# Patient Record
Sex: FEMALE | Race: WHITE | HISPANIC OR LATINO | URBAN - METROPOLITAN AREA
[De-identification: names, ages, dates, MRNs, and addresses within clinical notes are randomized per-mention and may not be internally consistent; named-entity substitution may affect disease eponyms.]

---

## 2021-02-09 ENCOUNTER — ANNUAL EXAM (OUTPATIENT)
Dept: OBGYN CLINIC | Facility: CLINIC | Age: 31
End: 2021-02-09
Payer: COMMERCIAL

## 2021-02-09 VITALS
SYSTOLIC BLOOD PRESSURE: 96 MMHG | WEIGHT: 128 LBS | TEMPERATURE: 97.8 F | DIASTOLIC BLOOD PRESSURE: 58 MMHG | HEIGHT: 63 IN | BODY MASS INDEX: 22.68 KG/M2

## 2021-02-09 DIAGNOSIS — Z01.419 ROUTINE GYNECOLOGICAL EXAMINATION: Primary | ICD-10-CM

## 2021-02-09 DIAGNOSIS — Z11.51 SCREENING FOR HUMAN PAPILLOMAVIRUS: ICD-10-CM

## 2021-02-09 DIAGNOSIS — Z12.4 SCREENING FOR MALIGNANT NEOPLASM OF THE CERVIX: ICD-10-CM

## 2021-02-09 PROCEDURE — 87624 HPV HI-RISK TYP POOLED RSLT: CPT | Performed by: OBSTETRICS & GYNECOLOGY

## 2021-02-09 PROCEDURE — G0145 SCR C/V CYTO,THINLAYER,RESCR: HCPCS | Performed by: OBSTETRICS & GYNECOLOGY

## 2021-02-09 PROCEDURE — 99385 PREV VISIT NEW AGE 18-39: CPT | Performed by: OBSTETRICS & GYNECOLOGY

## 2021-02-10 NOTE — PROGRESS NOTES
Subjective      Trav Hoff is a 26 y o  female who presents for annual well woman exam  Periods are regular every 28-30 days, lasting 5 days  No intermenstrual bleeding, spotting, or discharge  Current contraception: IUD  History of abnormal Pap smear: no    Menstrual History:  OB History        4    Para   3    Term                AB   1    Living   3       SAB   1    TAB        Ectopic        Multiple        Live Births                   Patient's last menstrual period was 2021 (approximate)  Period Cycle (Days): 28  Period Duration (Days): 4  Period Pattern: Regular  Menstrual Flow: Light  Dysmenorrhea: None    The following portions of the patient's history were reviewed and updated as appropriate: allergies, current medications, past family history, past medical history, past social history, past surgical history and problem list     Review of Systems  Review of Systems   Constitutional: Negative for activity change, appetite change, chills, fatigue and fever  Respiratory: Negative for cough and shortness of breath  Cardiovascular: Negative for chest pain, palpitations and leg swelling  Gastrointestinal: Negative for abdominal pain, constipation, diarrhea, nausea and vomiting  Genitourinary: Negative for difficulty urinating, dysuria, flank pain, frequency, hematuria, urgency and vaginal discharge  Neurological: Negative for dizziness and headaches  Psychiatric/Behavioral: Negative for confusion            Objective      BP 96/58 (BP Location: Left arm, Patient Position: Sitting, Cuff Size: Adult)   Temp 97 8 °F (36 6 °C) (Temporal)   Ht 5' 3" (1 6 m)   Wt 58 1 kg (128 lb)   LMP 2021 (Approximate)   BMI 22 67 kg/m²     Physical Exam  OBGyn Exam     General:   alert and oriented, in no acute distress, alert, appears stated age and cooperative   Heart: regular rate and rhythm, S1, S2 normal, no murmur, click, rub or gallop   Lungs: clear to auscultation bilaterally   Abdomen: soft, non-tender, without masses or organomegaly   Vulva: normal   Vagina: normal mucosa, normal discharge   Cervix: no cervical motion tenderness, no lesions and iud string seen    Uterus: normal size   Adnexa:  Breast Exam:  normal adnexa  breasts appear normal, no suspicious masses, no skin or nipple changes or axillary nodes  Scar from breast reduction                Assessment      @well woman@   27 yo female   Annual exam   IUD contraception   Asthma stable  Plan   Pap/hpv  Diet/exercsie  Ca vti D   RTO FOR ANNUAL EXAM    All questions answered  There are no Patient Instructions on file for this visit

## 2021-02-11 LAB
HPV HR 12 DNA CVX QL NAA+PROBE: NEGATIVE
HPV16 DNA CVX QL NAA+PROBE: NEGATIVE
HPV18 DNA CVX QL NAA+PROBE: NEGATIVE

## 2021-02-15 LAB
LAB AP GYN PRIMARY INTERPRETATION: NORMAL
Lab: NORMAL
PATH INTERP SPEC-IMP: NORMAL

## 2021-03-02 ENCOUNTER — OFFICE VISIT (OUTPATIENT)
Dept: OBGYN CLINIC | Facility: CLINIC | Age: 31
End: 2021-03-02
Payer: COMMERCIAL

## 2021-03-02 VITALS
BODY MASS INDEX: 22.32 KG/M2 | TEMPERATURE: 97.6 F | DIASTOLIC BLOOD PRESSURE: 62 MMHG | SYSTOLIC BLOOD PRESSURE: 112 MMHG | HEIGHT: 63 IN | WEIGHT: 126 LBS

## 2021-03-02 DIAGNOSIS — Z30.432 ENCOUNTER FOR IUD REMOVAL: ICD-10-CM

## 2021-03-02 DIAGNOSIS — B96.89 BV (BACTERIAL VAGINOSIS): ICD-10-CM

## 2021-03-02 DIAGNOSIS — N76.0 BV (BACTERIAL VAGINOSIS): ICD-10-CM

## 2021-03-02 DIAGNOSIS — Z97.5 CONTRACEPTION, DEVICE INTRAUTERINE: Primary | ICD-10-CM

## 2021-03-02 PROCEDURE — 58300 INSERT INTRAUTERINE DEVICE: CPT | Performed by: OBSTETRICS & GYNECOLOGY

## 2021-03-02 RX ORDER — ALBUTEROL SULFATE 90 UG/1
1 AEROSOL, METERED RESPIRATORY (INHALATION) EVERY 6 HOURS PRN
COMMUNITY

## 2021-03-02 RX ORDER — METRONIDAZOLE 500 MG/1
500 TABLET ORAL EVERY 12 HOURS SCHEDULED
Qty: 14 TABLET | Refills: 0 | Status: SHIPPED | OUTPATIENT
Start: 2021-03-02 | End: 2021-03-09

## 2021-03-03 NOTE — PROGRESS NOTES
Assessment/Plan:     Diagnoses and all orders for this visit:    Contraception, device intrauterine  -     levonorgestrel (MIRENA) IUD 20 mcg/day    BV (bacterial vaginosis)  -     metroNIDAZOLE (FLAGYL) 500 mg tablet; Take 1 tablet (500 mg total) by mouth every 12 (twelve) hours for 7 days    Other orders  -     albuterol (PROVENTIL HFA,VENTOLIN HFA) 90 mcg/act inhaler; Inhale 1 puff every 6 (six) hours as needed  -     levonorgestrel (MIRENA) 20 MCG/24HR IUD; 1 each by Intrauterine route        57-year-old female   IUD Mirena desired placement   White vaginal discharge positive BV  Plan   Flagyl   IUD removed and or UD Mirena inserted   Return to office in 4 weeks for string check  Subjective:      Patient ID: Tarv Hoff is a 26 y o  female  HPI  57-year-old female presents to the office today for IUD replacement desired Mirena IUD to be inserted  Iud insertions    Date/Time: 3/2/2021 8:40 PM  Performed by: Sreekanth Meza MD  Authorized by: Sreekanth Meza MD   Universal Protocol:  Consent: Verbal consent obtained  Written consent obtained  Risks and benefits: risks, benefits and alternatives were discussed  Consent given by: patient  Time out: Immediately prior to procedure a "time out" was called to verify the correct patient, procedure, equipment, support staff and site/side marked as required    Patient understanding: patient states understanding of the procedure being performed  Patient consent: the patient's understanding of the procedure matches consent given  Procedure consent: procedure consent matches procedure scheduled  Patient identity confirmed: verbally with patient        Procedure:     Pelvic exam performed: yes      Negative urine pregnancy test: yes      Cervix cleaned and prepped: yes      Speculum placed in vagina: yes      Tenaculum applied to cervix: yes      Uterus sounded: yes      Uterus sound depth (cm):  6    IUD inserted with no complications: yes      IUD type:  Mirena Strings trimmed: yes    Post-procedure:     Patient tolerated procedure well: yes      Patient will follow up after next period: yes    Comments:      Prior to IUD insertion IUD string was seen grasped with Viri Helton clamp shown to the patient white vaginal discharge noted Wet mount obtained positive clue cells and positive whiff test      The following portions of the patient's history were reviewed and updated as appropriate: allergies, current medications, past family history, past medical history, past social history, past surgical history and problem list     Review of Systems      Objective:      /62 (BP Location: Left arm, Patient Position: Sitting, Cuff Size: Adult)   Temp 97 6 °F (36 4 °C) (Temporal)   Ht 5' 3" (1 6 m)   Wt 57 2 kg (126 lb)   BMI 22 32 kg/m²          Physical Exam

## 2022-07-26 ENCOUNTER — CONSULT (OUTPATIENT)
Dept: OBGYN CLINIC | Facility: CLINIC | Age: 32
End: 2022-07-26
Payer: COMMERCIAL

## 2022-07-26 VITALS
WEIGHT: 142.4 LBS | BODY MASS INDEX: 25.23 KG/M2 | SYSTOLIC BLOOD PRESSURE: 104 MMHG | HEIGHT: 63 IN | DIASTOLIC BLOOD PRESSURE: 62 MMHG

## 2022-07-26 DIAGNOSIS — Z30.09 CONSULTATION FOR FEMALE STERILIZATION: Primary | ICD-10-CM

## 2022-07-26 PROCEDURE — 99213 OFFICE O/P EST LOW 20 MIN: CPT | Performed by: OBSTETRICS & GYNECOLOGY

## 2022-07-26 NOTE — PROGRESS NOTES
Assessment/Plan:     Diagnoses and all orders for this visit:    Consultation for female sterilization       26-year-old female   IUD contraception   Patient desired permanent sterilization  Plan   Scheduled for V note laparoscopic bilateral salpingectomy IUD removal   Return to office for annual exam    Subjective:      Patient ID: Evy Diaz is a 27 y o  female  HPI  26-year-old female presents to the office today for consult desire sterilization  Patient is currently has IUD Mirena and desired IUD removal as well as patient desire permanent sterilization   Different contraception option explained and discussed with patient in details with risk and benefit as well as side effect patient still desired to proceed with permanent sterilization bilateral salpingectomy discussed with patient risk of procedure bleeding infection trauma blood transfusion risk of bowel injury risk of needing another surgery or explained and discussed with patient risk of anesthesia nerve injury from positioning discussed patient aware of all the risk and desire to proceed with permanent sterilization will proceed with v NOTES  laparoscopic bilateral salpingectomy, possible laparoscopic salpingectomy Procedure explained and discussed with patient  The following portions of the patient's history were reviewed and updated as appropriate: allergies, current medications, past family history, past medical history, past social history, past surgical history and problem list     Review of Systems      Objective:      /62 (BP Location: Left arm, Patient Position: Sitting, Cuff Size: Adult)   Ht 5' 3" (1 6 m)   Wt 64 6 kg (142 lb 6 4 oz)   LMP 07/21/2022 (Exact Date)   BMI 25 23 kg/m²          Physical Exam  Constitutional:       Appearance: Normal appearance  Neurological:      General: No focal deficit present  Mental Status: She is alert and oriented to person, place, and time     Psychiatric:         Mood and Affect: Mood normal          Behavior: Behavior normal

## 2022-08-12 ENCOUNTER — HOSPITAL ENCOUNTER (EMERGENCY)
Facility: HOSPITAL | Age: 32
Discharge: HOME | End: 2022-08-12
Attending: EMERGENCY MEDICINE
Payer: COMMERCIAL

## 2022-08-12 VITALS
OXYGEN SATURATION: 98 % | TEMPERATURE: 98.6 F | RESPIRATION RATE: 17 BRPM | SYSTOLIC BLOOD PRESSURE: 109 MMHG | HEART RATE: 103 BPM | DIASTOLIC BLOOD PRESSURE: 80 MMHG

## 2022-08-12 DIAGNOSIS — K02.9 PAIN DUE TO DENTAL CARIES: Primary | ICD-10-CM

## 2022-08-12 PROCEDURE — 99282 EMERGENCY DEPT VISIT SF MDM: CPT

## 2022-08-12 PROCEDURE — 99284 EMERGENCY DEPT VISIT MOD MDM: CPT | Performed by: PHYSICIAN ASSISTANT

## 2022-08-12 RX ORDER — IBUPROFEN 600 MG/1
600 TABLET ORAL ONCE
Status: COMPLETED | OUTPATIENT
Start: 2022-08-12 | End: 2022-08-12

## 2022-08-12 RX ORDER — PENICILLIN V POTASSIUM 500 MG/1
1000 TABLET ORAL 2 TIMES DAILY
Qty: 40 TABLET | Refills: 0 | Status: SHIPPED | OUTPATIENT
Start: 2022-08-12 | End: 2022-08-22

## 2022-08-12 RX ORDER — IBUPROFEN 600 MG/1
600 TABLET ORAL EVERY 6 HOURS PRN
Qty: 20 TABLET | Refills: 0 | Status: SHIPPED | OUTPATIENT
Start: 2022-08-12

## 2022-08-12 RX ADMIN — IBUPROFEN 600 MG: 600 TABLET, FILM COATED ORAL at 18:29

## 2022-08-12 NOTE — DISCHARGE INSTRUCTIONS
Use Tylenol every 4 hours or Motrin every 6 hours; you can alternate the 2 medications taking something every 3 hours for pain or fever  Take all oral antibiotics until done        Follow-up with dentist

## 2022-08-12 NOTE — ED PROVIDER NOTES
History  Chief Complaint   Patient presents with    Dental Pain     Pt presents to ED with c/o right lower dental pain, pain started 2 days, pt took tylenol     PMH asthma  no pertinent PSH   presents to ED states chronic right back molar fracture, now over the past 2 days, has had increased pain, area feels swollen, concern for possible abscess  Patient called dentist but could not schedule an appointment for over a month  No fever, no facial swelling, no nausea vomiting; has only been taking Tylenol for pain          Prior to Admission Medications   Prescriptions Last Dose Informant Patient Reported? Taking? albuterol (PROVENTIL HFA,VENTOLIN HFA) 90 mcg/act inhaler   Yes No   Sig: Inhale 1 puff every 6 (six) hours as needed   levonorgestrel (MIRENA) 20 MCG/24HR IUD   Yes No   Si each by Intrauterine route      Facility-Administered Medications: None       Past Medical History:   Diagnosis Date    Asthma        Past Surgical History:   Procedure Laterality Date    REDUCTION MAMMAPLASTY         Family History   Problem Relation Age of Onset    Diabetes Mother     Prostate cancer Father     Breast cancer Paternal Aunt      I have reviewed and agree with the history as documented  E-Cigarette/Vaping    E-Cigarette Use Never User      E-Cigarette/Vaping Substances    Nicotine No     THC No     CBD No     Flavoring No     Other No     Unknown No      Social History     Tobacco Use    Smoking status: Never Smoker    Smokeless tobacco: Never Used   Vaping Use    Vaping Use: Never used   Substance Use Topics    Alcohol use: Yes     Comment: socially    Drug use: Never       Review of Systems   Constitutional: Negative for appetite change and fever  HENT: Positive for dental problem  Negative for hearing loss and sore throat  Eyes: Negative for visual disturbance  Respiratory: Negative for cough  Cardiovascular: Negative for chest pain     Gastrointestinal: Negative for abdominal pain and vomiting  Musculoskeletal: Negative for arthralgias and myalgias  Skin: Negative for rash  Neurological: Negative for weakness and headaches  Psychiatric/Behavioral: Negative for behavioral problems  All other systems reviewed and are negative  Physical Exam  Physical Exam  Vitals and nursing note reviewed  Constitutional:       General: She is not in acute distress  Appearance: Normal appearance  She is well-developed  HENT:      Head: Normocephalic and atraumatic  Right Ear: External ear normal       Left Ear: External ear normal       Nose: Nose normal       Mouth/Throat:      Mouth: Mucous membranes are moist       Dentition: Abnormal dentition  Dental tenderness and dental caries present  No gingival swelling or dental abscesses  Pharynx: Oropharynx is clear  Comments: Broken (circled) tooth along inner aspect, no facial swelling  Eyes:      Conjunctiva/sclera: Conjunctivae normal    Cardiovascular:      Rate and Rhythm: Normal rate  Pulmonary:      Effort: Pulmonary effort is normal  No respiratory distress  Musculoskeletal:         General: Normal range of motion  Cervical back: Normal range of motion  Lymphadenopathy:      Cervical: No cervical adenopathy  Skin:     General: Skin is warm and dry  Findings: No rash  Neurological:      Mental Status: She is alert     Psychiatric:         Behavior: Behavior normal          Vital Signs  ED Triage Vitals   Temperature Pulse Respirations Blood Pressure SpO2   08/12/22 1816 08/12/22 1816 08/12/22 1816 08/12/22 1816 08/12/22 1816   98 6 °F (37 °C) 103 17 109/80 98 %      Temp Source Heart Rate Source Patient Position - Orthostatic VS BP Location FiO2 (%)   08/12/22 1816 08/12/22 1816 -- -- --   Oral Monitor         Pain Score       08/12/22 1829       10 - Worst Possible Pain           Vitals:    08/12/22 1816   BP: 109/80   Pulse: 103         Visual Acuity      ED Medications  Medications   ibuprofen (MOTRIN) tablet 600 mg (600 mg Oral Given 8/12/22 1829)       Diagnostic Studies  Results Reviewed     None                 No orders to display              Procedures  Procedures         ED Course                               SBIRT 22yo+    Flowsheet Row Most Recent Value   SBIRT (25 yo +)    In order to provide better care to our patients, we are screening all of our patients for alcohol and drug use  Would it be okay to ask you these screening questions? No Filed at: 08/12/2022 1830                    MDM    Disposition  Final diagnoses:   Pain due to dental caries     Time reflects when diagnosis was documented in both MDM as applicable and the Disposition within this note     Time User Action Codes Description Comment    8/12/2022  6:19 PM Ezequiel Carbone Add [K02 9] Pain due to dental caries       ED Disposition     ED Disposition   Discharge    Condition   Stable    Date/Time   Fri Aug 12, 2022  6:19 PM    Comment   [address] Portillo discharge to home/self care                 Follow-up Information     Follow up With Specialties Details Why Contact Info Additional [address] [facility]   [address] [phone]  [address] [place] [address], [phone], [id number] [place] [address],[facility] [address] [facility] [place] [address]  [phone]           Discharge Medication List as of 8/12/2022  6:21 PM      START taking these medications    Details   ibuprofen (MOTRIN) 600 mg tablet Take 1 tablet (600 mg total) by mouth every 6 (six) hours as needed for mild pain, Starting Fri 8/12/2022, Normal      penicillin V potassium (VEETID) 500 mg tablet Take 2 tablets (1,000 mg total) by mouth 2 (two) times a day for 10 days, Starting Fri 8/12/2022, Until Mon 8/22/2022, Normal         CONTINUE these medications which have NOT CHANGED    Details   albuterol (PROVENTIL HFA,VENTOLIN HFA) 90 mcg/act inhaler Inhale 1 puff every 6 (six) hours as needed, Historical Med      levonorgestrel (MIRENA) 20 MCG/24HR IUD 1 each by Intrauterine route, Historical Med             No discharge procedures on file      PDMP Review     None          ED Provider  Electronically Signed by           Misha Benjamin PA-C  08/12/22 7651

## 2022-10-11 ENCOUNTER — APPOINTMENT (OUTPATIENT)
Dept: LAB | Facility: HOSPITAL | Age: 32
End: 2022-10-11
Payer: COMMERCIAL

## 2022-10-11 DIAGNOSIS — Z01.818 PRE-OP TESTING: ICD-10-CM

## 2022-10-11 LAB
ANION GAP SERPL CALCULATED.3IONS-SCNC: 7 MMOL/L (ref 4–13)
BASOPHILS # BLD AUTO: 0.05 THOUSANDS/ΜL (ref 0–0.1)
BASOPHILS NFR BLD AUTO: 1 % (ref 0–1)
BUN SERPL-MCNC: 8 MG/DL (ref 5–25)
CALCIUM SERPL-MCNC: 8.5 MG/DL (ref 8.3–10.1)
CHLORIDE SERPL-SCNC: 105 MMOL/L (ref 96–108)
CO2 SERPL-SCNC: 28 MMOL/L (ref 21–32)
CREAT SERPL-MCNC: 0.68 MG/DL (ref 0.6–1.3)
EOSINOPHIL # BLD AUTO: 0.04 THOUSAND/ΜL (ref 0–0.61)
EOSINOPHIL NFR BLD AUTO: 1 % (ref 0–6)
ERYTHROCYTE [DISTWIDTH] IN BLOOD BY AUTOMATED COUNT: 12.2 % (ref 11.6–15.1)
GFR SERPL CREATININE-BSD FRML MDRD: 114 ML/MIN/1.73SQ M
GLUCOSE SERPL-MCNC: 90 MG/DL (ref 65–140)
HCT VFR BLD AUTO: 38.3 % (ref 34.8–46.1)
HGB BLD-MCNC: 12.7 G/DL (ref 11.5–15.4)
IMM GRANULOCYTES # BLD AUTO: 0.04 THOUSAND/UL (ref 0–0.2)
IMM GRANULOCYTES NFR BLD AUTO: 1 % (ref 0–2)
LYMPHOCYTES # BLD AUTO: 2.96 THOUSANDS/ΜL (ref 0.6–4.47)
LYMPHOCYTES NFR BLD AUTO: 40 % (ref 14–44)
MCH RBC QN AUTO: 29.7 PG (ref 26.8–34.3)
MCHC RBC AUTO-ENTMCNC: 33.2 G/DL (ref 31.4–37.4)
MCV RBC AUTO: 90 FL (ref 82–98)
MONOCYTES # BLD AUTO: 0.48 THOUSAND/ΜL (ref 0.17–1.22)
MONOCYTES NFR BLD AUTO: 6 % (ref 4–12)
NEUTROPHILS # BLD AUTO: 3.92 THOUSANDS/ΜL (ref 1.85–7.62)
NEUTS SEG NFR BLD AUTO: 51 % (ref 43–75)
NRBC BLD AUTO-RTO: 0 /100 WBCS
PLATELET # BLD AUTO: 330 THOUSANDS/UL (ref 149–390)
PMV BLD AUTO: 9.4 FL (ref 8.9–12.7)
POTASSIUM SERPL-SCNC: 3.8 MMOL/L (ref 3.5–5.3)
RBC # BLD AUTO: 4.28 MILLION/UL (ref 3.81–5.12)
SODIUM SERPL-SCNC: 140 MMOL/L (ref 135–147)
WBC # BLD AUTO: 7.49 THOUSAND/UL (ref 4.31–10.16)

## 2022-10-11 PROCEDURE — 36415 COLL VENOUS BLD VENIPUNCTURE: CPT

## 2022-10-11 PROCEDURE — 80048 BASIC METABOLIC PNL TOTAL CA: CPT

## 2022-10-11 PROCEDURE — 85025 COMPLETE CBC W/AUTO DIFF WBC: CPT

## 2022-10-12 NOTE — PRE-PROCEDURE INSTRUCTIONS
Pre-Surgery Instructions:   Medication Instructions   • albuterol (PROVENTIL HFA,VENTOLIN HFA) 90 mcg/act inhaler Instructed to take per normal schedule including DOS   • ibuprofen (MOTRIN) 600 mg tablet Instructed to avoid NSAIDs 3 days prior to surgery   • levonorgestrel (MIRENA) 20 MCG/24HR IUD IUD    Pre-op medication, and showering instructions with antibacteral soap reviewed  Instructed to avoid all ASA/NSAIDs and OTC Vit/Supp from now until after surgery per anesthesia guidelines  Tylenol ok prn  Pt  Verbalized an understanding of all instructions reviewed and offers no concerns at this time

## 2022-10-17 ENCOUNTER — ANESTHESIA EVENT (OUTPATIENT)
Dept: PERIOP | Facility: HOSPITAL | Age: 32
End: 2022-10-17
Payer: COMMERCIAL

## 2022-10-17 ENCOUNTER — ANESTHESIA (OUTPATIENT)
Dept: PERIOP | Facility: HOSPITAL | Age: 32
End: 2022-10-17
Payer: COMMERCIAL

## 2022-10-17 ENCOUNTER — HOSPITAL ENCOUNTER (OUTPATIENT)
Facility: HOSPITAL | Age: 32
Setting detail: OUTPATIENT SURGERY
Discharge: HOME | End: 2022-10-17
Attending: OBSTETRICS & GYNECOLOGY | Admitting: OBSTETRICS & GYNECOLOGY
Payer: COMMERCIAL

## 2022-10-17 VITALS
RESPIRATION RATE: 15 BRPM | OXYGEN SATURATION: 96 % | SYSTOLIC BLOOD PRESSURE: 112 MMHG | BODY MASS INDEX: 24.45 KG/M2 | HEART RATE: 80 BPM | WEIGHT: 138 LBS | DIASTOLIC BLOOD PRESSURE: 55 MMHG | TEMPERATURE: 98 F | HEIGHT: 63 IN

## 2022-10-17 DIAGNOSIS — K02.9 PAIN DUE TO DENTAL CARIES: ICD-10-CM

## 2022-10-17 DIAGNOSIS — Z90.79 STATUS POST BILATERAL SALPINGECTOMY: Primary | ICD-10-CM

## 2022-10-17 DIAGNOSIS — Z30.2 STERILIZATION: ICD-10-CM

## 2022-10-17 LAB
EXT PREGNANCY TEST URINE: NEGATIVE
EXT. CONTROL: NORMAL

## 2022-10-17 PROCEDURE — 88302 TISSUE EXAM BY PATHOLOGIST: CPT | Performed by: STUDENT IN AN ORGANIZED HEALTH CARE EDUCATION/TRAINING PROGRAM

## 2022-10-17 PROCEDURE — NC001 PR NO CHARGE: Performed by: OBSTETRICS & GYNECOLOGY

## 2022-10-17 PROCEDURE — 81025 URINE PREGNANCY TEST: CPT | Performed by: OBSTETRICS & GYNECOLOGY

## 2022-10-17 RX ORDER — DEXAMETHASONE SODIUM PHOSPHATE 10 MG/ML
INJECTION, SOLUTION INTRAMUSCULAR; INTRAVENOUS AS NEEDED
Status: DISCONTINUED | OUTPATIENT
Start: 2022-10-17 | End: 2022-10-17

## 2022-10-17 RX ORDER — CEFAZOLIN SODIUM 2 G/50ML
2000 SOLUTION INTRAVENOUS ONCE
Status: COMPLETED | OUTPATIENT
Start: 2022-10-17 | End: 2022-10-17

## 2022-10-17 RX ORDER — PROPOFOL 10 MG/ML
INJECTION, EMULSION INTRAVENOUS CONTINUOUS PRN
Status: DISCONTINUED | OUTPATIENT
Start: 2022-10-17 | End: 2022-10-17

## 2022-10-17 RX ORDER — SODIUM CHLORIDE, SODIUM LACTATE, POTASSIUM CHLORIDE, CALCIUM CHLORIDE 600; 310; 30; 20 MG/100ML; MG/100ML; MG/100ML; MG/100ML
INJECTION, SOLUTION INTRAVENOUS CONTINUOUS PRN
Status: DISCONTINUED | OUTPATIENT
Start: 2022-10-17 | End: 2022-10-17

## 2022-10-17 RX ORDER — SODIUM CHLORIDE, SODIUM LACTATE, POTASSIUM CHLORIDE, CALCIUM CHLORIDE 600; 310; 30; 20 MG/100ML; MG/100ML; MG/100ML; MG/100ML
100 INJECTION, SOLUTION INTRAVENOUS CONTINUOUS
Status: DISCONTINUED | OUTPATIENT
Start: 2022-10-17 | End: 2022-10-17 | Stop reason: HOSPADM

## 2022-10-17 RX ORDER — KETOROLAC TROMETHAMINE 30 MG/ML
INJECTION, SOLUTION INTRAMUSCULAR; INTRAVENOUS AS NEEDED
Status: DISCONTINUED | OUTPATIENT
Start: 2022-10-17 | End: 2022-10-17

## 2022-10-17 RX ORDER — BUPIVACAINE HYDROCHLORIDE AND EPINEPHRINE 2.5; 5 MG/ML; UG/ML
INJECTION, SOLUTION EPIDURAL; INFILTRATION; INTRACAUDAL; PERINEURAL AS NEEDED
Status: DISCONTINUED | OUTPATIENT
Start: 2022-10-17 | End: 2022-10-17 | Stop reason: HOSPADM

## 2022-10-17 RX ORDER — SENNOSIDES 8.6 MG
650 CAPSULE ORAL EVERY 8 HOURS PRN
Qty: 30 TABLET | Refills: 0 | Status: SHIPPED | OUTPATIENT
Start: 2022-10-17

## 2022-10-17 RX ORDER — LIDOCAINE HYDROCHLORIDE 10 MG/ML
INJECTION, SOLUTION EPIDURAL; INFILTRATION; INTRACAUDAL; PERINEURAL AS NEEDED
Status: DISCONTINUED | OUTPATIENT
Start: 2022-10-17 | End: 2022-10-17

## 2022-10-17 RX ORDER — ROCURONIUM BROMIDE 10 MG/ML
INJECTION, SOLUTION INTRAVENOUS AS NEEDED
Status: DISCONTINUED | OUTPATIENT
Start: 2022-10-17 | End: 2022-10-17

## 2022-10-17 RX ORDER — IBUPROFEN 600 MG/1
600 TABLET ORAL EVERY 6 HOURS PRN
Qty: 30 TABLET | Refills: 0 | Status: SHIPPED | OUTPATIENT
Start: 2022-10-17

## 2022-10-17 RX ORDER — ONDANSETRON 2 MG/ML
INJECTION INTRAMUSCULAR; INTRAVENOUS AS NEEDED
Status: DISCONTINUED | OUTPATIENT
Start: 2022-10-17 | End: 2022-10-17

## 2022-10-17 RX ORDER — OXYCODONE HYDROCHLORIDE 5 MG/1
5 TABLET ORAL EVERY 4 HOURS PRN
Status: DISCONTINUED | OUTPATIENT
Start: 2022-10-17 | End: 2022-10-17 | Stop reason: HOSPADM

## 2022-10-17 RX ORDER — ACETAMINOPHEN 325 MG/1
975 TABLET ORAL EVERY 6 HOURS PRN
Status: DISCONTINUED | OUTPATIENT
Start: 2022-10-17 | End: 2022-10-17 | Stop reason: HOSPADM

## 2022-10-17 RX ORDER — MIDAZOLAM HYDROCHLORIDE 2 MG/2ML
INJECTION, SOLUTION INTRAMUSCULAR; INTRAVENOUS AS NEEDED
Status: DISCONTINUED | OUTPATIENT
Start: 2022-10-17 | End: 2022-10-17

## 2022-10-17 RX ORDER — PROPOFOL 10 MG/ML
INJECTION, EMULSION INTRAVENOUS AS NEEDED
Status: DISCONTINUED | OUTPATIENT
Start: 2022-10-17 | End: 2022-10-17

## 2022-10-17 RX ORDER — FENTANYL CITRATE 50 UG/ML
INJECTION, SOLUTION INTRAMUSCULAR; INTRAVENOUS AS NEEDED
Status: DISCONTINUED | OUTPATIENT
Start: 2022-10-17 | End: 2022-10-17

## 2022-10-17 RX ORDER — FENTANYL CITRATE/PF 50 MCG/ML
25 SYRINGE (ML) INJECTION
Status: DISCONTINUED | OUTPATIENT
Start: 2022-10-17 | End: 2022-10-17 | Stop reason: HOSPADM

## 2022-10-17 RX ORDER — ONDANSETRON 2 MG/ML
4 INJECTION INTRAMUSCULAR; INTRAVENOUS ONCE AS NEEDED
Status: DISCONTINUED | OUTPATIENT
Start: 2022-10-17 | End: 2022-10-17 | Stop reason: HOSPADM

## 2022-10-17 RX ORDER — OXYCODONE HYDROCHLORIDE 5 MG/1
10 TABLET ORAL EVERY 4 HOURS PRN
Status: DISCONTINUED | OUTPATIENT
Start: 2022-10-17 | End: 2022-10-17 | Stop reason: HOSPADM

## 2022-10-17 RX ORDER — HYDROMORPHONE HCL/PF 1 MG/ML
0.2 SYRINGE (ML) INJECTION
Status: DISCONTINUED | OUTPATIENT
Start: 2022-10-17 | End: 2022-10-17 | Stop reason: HOSPADM

## 2022-10-17 RX ORDER — ONDANSETRON 2 MG/ML
4 INJECTION INTRAMUSCULAR; INTRAVENOUS EVERY 6 HOURS PRN
Status: CANCELLED | OUTPATIENT
Start: 2022-10-17

## 2022-10-17 RX ADMIN — FENTANYL CITRATE 25 MCG: 50 INJECTION, SOLUTION INTRAMUSCULAR; INTRAVENOUS at 10:46

## 2022-10-17 RX ADMIN — LIDOCAINE HYDROCHLORIDE 50 MG: 10 INJECTION, SOLUTION EPIDURAL; INFILTRATION; INTRACAUDAL; PERINEURAL at 09:54

## 2022-10-17 RX ADMIN — FENTANYL CITRATE 25 MCG: 50 INJECTION, SOLUTION INTRAMUSCULAR; INTRAVENOUS at 10:54

## 2022-10-17 RX ADMIN — DEXAMETHASONE SODIUM PHOSPHATE 10 MG: 10 INJECTION, SOLUTION INTRAMUSCULAR; INTRAVENOUS at 09:54

## 2022-10-17 RX ADMIN — FENTANYL CITRATE 100 MCG: 50 INJECTION, SOLUTION INTRAMUSCULAR; INTRAVENOUS at 09:54

## 2022-10-17 RX ADMIN — FENTANYL CITRATE 50 MCG: 50 INJECTION, SOLUTION INTRAMUSCULAR; INTRAVENOUS at 11:04

## 2022-10-17 RX ADMIN — ONDANSETRON 4 MG: 2 INJECTION INTRAMUSCULAR; INTRAVENOUS at 10:44

## 2022-10-17 RX ADMIN — KETOROLAC TROMETHAMINE 30 MG: 30 INJECTION, SOLUTION INTRAMUSCULAR at 10:46

## 2022-10-17 RX ADMIN — PROPOFOL 150 MG: 10 INJECTION, EMULSION INTRAVENOUS at 09:54

## 2022-10-17 RX ADMIN — PROPOFOL 80 MCG/KG/MIN: 10 INJECTION, EMULSION INTRAVENOUS at 09:59

## 2022-10-17 RX ADMIN — MIDAZOLAM HYDROCHLORIDE 2 MG: 1 INJECTION, SOLUTION INTRAMUSCULAR; INTRAVENOUS at 09:50

## 2022-10-17 RX ADMIN — SODIUM CHLORIDE, SODIUM LACTATE, POTASSIUM CHLORIDE, AND CALCIUM CHLORIDE: .6; .31; .03; .02 INJECTION, SOLUTION INTRAVENOUS at 09:49

## 2022-10-17 RX ADMIN — SUGAMMADEX 200 MG: 100 INJECTION, SOLUTION INTRAVENOUS at 10:52

## 2022-10-17 RX ADMIN — CEFAZOLIN SODIUM 2000 MG: 2 SOLUTION INTRAVENOUS at 09:57

## 2022-10-17 RX ADMIN — ROCURONIUM BROMIDE 50 MG: 50 INJECTION, SOLUTION INTRAVENOUS at 09:54

## 2022-10-17 NOTE — ANESTHESIA POSTPROCEDURE EVALUATION
Post-Op Assessment Note    CV Status:  Stable    Pain management: adequate     Mental Status:  Alert   Hydration Status:  Stable   PONV Controlled:  None   Airway Patency:  Patent and adequate      Post Op Vitals Reviewed: Yes      Staff: Anesthesiologist, CRNA         No complications documented      BP   119/74   Temp  97 3   Pulse 88   Resp 14   SpO2 96% on RA

## 2022-10-17 NOTE — H&P
38-year-old female   IUD contraception   Patient desired permanent sterilization  Plan   Scheduled for V note laparoscopic bilateral salpingectomy IUD removal   Return to office for annual exam     Subjective:       Patient ID: Eunice Lynch is a 27 y o  female      HPI  38-year-old female presents to the office today for consult desire sterilization  Patient is currently has IUD Mirena and desired IUD removal as well as patient desire permanent sterilization   Different contraception option explained and discussed with patient in details with risk and benefit as well as side effect patient still desired to proceed with permanent sterilization bilateral salpingectomy discussed with patient risk of procedure bleeding infection trauma blood transfusion risk of bowel injury risk of needing another surgery or explained and discussed with patient risk of anesthesia nerve injury from positioning discussed patient aware of all the risk and desire to proceed with permanent sterilization will proceed with v NOTES  laparoscopic bilateral salpingectomy, possible laparoscopic salpingectomy Procedure explained and discussed with patient  The following portions of the patient's history were reviewed and updated as appropriate: allergies, current medications, past family history, past medical history, past social history, past surgical history and problem list      Review of Systems       Objective:        /62 (BP Location: Left arm, Patient Position: Sitting, Cuff Size: Adult)   Ht 5' 3" (1 6 m)   Wt 64 6 kg (142 lb 6 4 oz)   LMP 07/21/2022 (Exact Date)   BMI 25 23 kg/m²             Physical Exam  Constitutional:       Appearance: Normal appearance  Neurological:      General: No focal deficit present  Mental Status: She is alert and oriented to person, place, and time     Psychiatric:         Mood and Affect: Mood normal          Behavior: Behavior normal         abd soft NT  EXT NO EDEMA no calf tenderness  Lungs cta b/l   cvs s1/s2 wnl

## 2022-10-17 NOTE — INTERVAL H&P NOTE
H&P reviewed  After examining the patient I find no changes in the patients condition since the H&P had been written      Vitals:    10/17/22 0857   BP: 145/69   Pulse: 74   Resp: 16   Temp: (!) 97 °F (36 1 °C)   SpO2: 100%

## 2022-10-17 NOTE — ANESTHESIA PREPROCEDURE EVALUATION
Procedure:  VNOTE BILATERAL SALPINGECTOMY, LAPAROSCOPIC (Bilateral Abdomen)  REMOVAL OF INTRAUTERINE DEVICE (IUD) (N/A Uterus)    Relevant Problems   PULMONARY   (+) Asthma             Anesthesia Plan  ASA Score- 1     Anesthesia Type- general with ASA Monitors  Additional Monitors:   Airway Plan: ETT  Plan Factors-    Chart reviewed  Induction- intravenous  Postoperative Plan-     Informed Consent- Anesthetic plan and risks discussed with patient  I personally reviewed this patient with the CRNA  Discussed and agreed on the Anesthesia Plan with the CRNA  Leandro Brothers

## 2022-10-17 NOTE — OP NOTE
OPERATIVE REPORT  PATIENT NAME: Adry Garcia    :  1990  MRN: [id number]  Pt Location: [facility] OR ROOM 02    SURGERY DATE: 10/17/2022    Surgeon(s) and Role:     * Gutierrez Mandujano MD - Primary     * Amber Ortega PA-C - Lyssa Meng MD - Assisting    Preop Diagnosis:  Sterilization [Z30 2]    Post-Op Diagnosis Codes:     * Sterilization [Z30 2]    Procedure(s) (LRB):  VNOTE BILATERAL SALPINGECTOMY, LAPAROSCOPIC (Bilateral)  REMOVAL OF INTRAUTERINE DEVICE (IUD) (N/A)    Specimen(s):  ID Type Source Tests Collected by Time Destination   1 : BILATERAL FALLOPIAN TUBES Tissue Fallopian Tubes, Bilateral TISSUE EXAM Gutierrez Mandujano MD 10/17/2022 4797        Estimated Blood Loss:   Minimal    Drains:  [REMOVED] Urethral Catheter Double-lumen;Non-latex 16 Fr  (Removed)   Number of days: 0       Anesthesia Type:   General    Operative Indications:  Sterilization [Z30 2]      Operative Findings:  1  Normal appearing external genitalia  2  Multiparous cervix  3  Normal appearing uterus, bilateral fallopian tubes and ovaries  Complications:   None    Procedure and Technique: A Irving catheter was introduced into the bladder  A Rubin retractor was inserted into the vagina and rthe Rivka retractor used to visualize the anterior lip of the cervix, which was then grasped with an Allis clamp  1 cm below the cervicovaginal crease the vaginal mucosa was infiltrated with 0 25% Marcaine with epinephrine  A 2cm incision was made at the infiltrated edge of the vaginal mucosa and the peritoneum was grasped with forceps and entered sharply with Jasso scissors for introduction of the gel port ring  The anterior and posterior edges of the colpotomy were tagged with multiple 0 Vicryl sutures securing peritoneum to both edges of the colpotomy  Intraperitoneal entrance confirmed with the surgeon's hand palpating the posterior surface of the uterus   Allis clamp was removed an the gel port ring was inserted and the gel port was then attached and insufflation was attached and pneumoperitoneum was maintained at 15mmHG  Patient was placed in Trendelenburg and the uterus was elevated to visualize the fallopian tubes  Findings were noted as above  A bowel grasper was inserted through the port and used to visualize the fimbriated ends of bilateral tubes  Ligasure was used to cauterize and transect the mesosalpinx starting from the fimbriae traveling proximally before being transected across the tub for removal from the abdomen into the vaginal canal   Excellent hemostasis was confirmed  Under direct visualization  The entire abdomen and pelvis was inspected and there was no evidence of injury to bowel, bladder, vasculature, or other structures  Pneumoperitoneum was then allowed to escape  The gel port was removed and both tubes were removed from the vagina  The Rubin retractor was reinserted into the vagina and the colpotomy was closed with running unlocked 0 Vicryl sutures  Three figure of eight sutures were required to achieve hemostasis  All instruments were then removed from the vagina        Patient Disposition:  PACU         SIGNATURE: Perla Boyce MD  DATE: October 17, 2022  TIME: 11:09 AM

## 2022-10-18 NOTE — ANESTHESIA POSTPROCEDURE EVALUATION
Post-Op Assessment Note    CV Status:  Stable    Pain management: adequate     Mental Status:  Alert and awake   Hydration Status:  Euvolemic   PONV Controlled:  Controlled   Airway Patency:  Patent      Post Op Vitals Reviewed: Yes      Reason for prolonged intubation > 24 hours:   Reason for prolonged intubation > 48 hours:         No complications documented      BP      Temp      Pulse     Resp      SpO2

## 2022-10-26 PROCEDURE — 88302 TISSUE EXAM BY PATHOLOGIST: CPT | Performed by: STUDENT IN AN ORGANIZED HEALTH CARE EDUCATION/TRAINING PROGRAM

## 2022-11-21 ENCOUNTER — OFFICE VISIT (OUTPATIENT)
Dept: OBGYN CLINIC | Facility: CLINIC | Age: 32
End: 2022-11-21

## 2022-11-21 VITALS
BODY MASS INDEX: 24.98 KG/M2 | DIASTOLIC BLOOD PRESSURE: 64 MMHG | HEIGHT: 63 IN | WEIGHT: 141 LBS | SYSTOLIC BLOOD PRESSURE: 102 MMHG

## 2022-11-21 DIAGNOSIS — Z90.79 STATUS POST BILATERAL SALPINGECTOMY: Primary | ICD-10-CM

## 2022-11-22 NOTE — PROGRESS NOTES
Assessment/Plan:     Diagnoses and all orders for this visit:    Status post bilateral salpingectomy        41-year-old female   Status post V notes bilateral salpingectomy  Plan   Doing well postop   Return to office for annual exam  Subjective:      Patient ID: Mihai Car is a 27 y o  female  HPI  Patient seen evaluated presents to the office today status post bilateral salpingectomy via V notes patient has no complain denies any nausea vomiting denies any diarrhea or constipation denies any urgency frequency or dysuria denies any vaginal bleeding denies any dyspareunia  The following portions of the patient's history were reviewed and updated as appropriate: allergies, current medications, past family history, past medical history, past social history, past surgical history and problem list     Review of Systems      Objective:      /64 (BP Location: Left arm, Patient Position: Sitting, Cuff Size: Adult)   Ht 5' 3" (1 6 m)   Wt 64 kg (141 lb)   BMI 24 98 kg/m²          Physical Exam  Constitutional:       Appearance: She is well-developed and well-nourished  Abdominal:      General: There is no distension  Palpations: Abdomen is soft  Tenderness: There is no abdominal tenderness  Genitourinary:     Labia:         Right: No rash, tenderness or lesion  Left: No rash, tenderness or lesion  Vagina: No signs of injury  No vaginal discharge, erythema or tenderness  Cervix: No cervical motion tenderness, discharge or friability  Uterus: Normal, normal        Adnexa:         Right: No mass, tenderness or fullness  Left: No mass, tenderness or fullness  Neurological:      Mental Status: She is alert and oriented to person, place, and time     Psychiatric:         Mood and Affect: Mood and affect normal          Behavior: Behavior normal

## 2023-03-06 ENCOUNTER — ANNUAL EXAM (OUTPATIENT)
Dept: OBGYN CLINIC | Facility: CLINIC | Age: 33
End: 2023-03-06

## 2023-03-06 VITALS
DIASTOLIC BLOOD PRESSURE: 80 MMHG | SYSTOLIC BLOOD PRESSURE: 130 MMHG | WEIGHT: 145 LBS | HEIGHT: 63 IN | BODY MASS INDEX: 25.69 KG/M2

## 2023-03-06 DIAGNOSIS — N89.8 VAGINAL DISCHARGE: ICD-10-CM

## 2023-03-06 DIAGNOSIS — Z01.419 ENCOUNTER FOR GYNECOLOGICAL EXAMINATION WITHOUT ABNORMAL FINDING: Primary | ICD-10-CM

## 2023-03-06 DIAGNOSIS — Z11.3 SCREEN FOR STD (SEXUALLY TRANSMITTED DISEASE): ICD-10-CM

## 2023-03-06 NOTE — PROGRESS NOTES
Assessment/Plan:    No problem-specific Assessment & Plan notes found for this encounter  Diagnoses and all orders for this visit:    Encounter for gynecological examination without abnormal finding  -     Liquid-based pap, screening    Vaginal discharge  -     VAGINOSIS DNA PROBE (AFFIRM)    Screen for STD (sexually transmitted disease)  -     Chlamydia/GC amplified DNA by PCR        Pap, GC/chlamydia screening, and vaginal cultures done  We will call with results and treat as necessary  Feelings of "vaginal air" could be due to pelvic floor dysfunction; vagina normal on exam   Call if periods worsen or change  If no problems, patient to return in 1 year for routine gyn care  Subjective:      Patient ID: Olivia Martin is a 27 y o  female  Patient is here for yearly gyn exam   States she is doing well overall  S/p tubal sterilization in October 2022  Periods are regular every 28 days, and bleeding lasts for 5-6 days  She denies heavy bleeding, severe cramping, HA, and mood symptoms  Complains of an increase in vaginal discharge and odor since her surgery, as well as "vaginal air"  She is sexually active with a monogamous partner; requests STD screening  Patient denies bowel/bladder changes, pelvic pain, bloating, abdominal pain, n/v, change in appetite, and thyroid disease  Patient is performing self-breast exam   Denies new masses, skin changes, nipple discharge, and pain/tenderness  The following portions of the patient's history were reviewed and updated as appropriate: allergies, current medications, past family history, past medical history, past social history, past surgical history and problem list     Review of Systems   Constitutional: Negative for appetite change and unexpected weight change  Cardiovascular:        No masses, skin changes, nipple discharge, and pain/tenderness     Gastrointestinal: Negative for abdominal distention, abdominal pain, constipation, diarrhea, nausea and vomiting  Genitourinary: Negative for difficulty urinating, dysuria, frequency, genital sores, hematuria, menstrual problem, pelvic pain, urgency, vaginal bleeding, vaginal discharge (With odor) and vaginal pain  Objective:      /80 (BP Location: Left arm, Patient Position: Sitting, Cuff Size: Adult)   Ht 5' 3" (1 6 m)   Wt 65 8 kg (145 lb)   LMP 02/13/2023 (Approximate)   BMI 25 69 kg/m²          Physical Exam  Vitals reviewed  Exam conducted with a chaperone present  Constitutional:       Appearance: Normal appearance  She is well-developed and normal weight  Neck:      Thyroid: No thyromegaly  Pulmonary:      Effort: Pulmonary effort is normal    Chest:   Breasts:     Breasts are symmetrical       Right: Normal  No swelling, bleeding, inverted nipple, mass, nipple discharge, skin change or tenderness  Left: Normal  No swelling, bleeding, inverted nipple, mass, nipple discharge, skin change or tenderness  Abdominal:      General: Abdomen is flat  There is no distension  Palpations: Abdomen is soft  Tenderness: There is no abdominal tenderness  Genitourinary:     General: Normal vulva  Pubic Area: No rash  Labia:         Right: No rash, tenderness, lesion or injury  Left: No rash, tenderness, lesion or injury  Vagina: Normal  No vaginal discharge, erythema, tenderness or bleeding  Cervix: Normal       Uterus: Normal        Adnexa: Right adnexa normal and left adnexa normal         Right: No mass, tenderness or fullness  Left: No mass, tenderness or fullness  Musculoskeletal:      Cervical back: Neck supple  Lymphadenopathy:      Cervical: No cervical adenopathy  Upper Body:      Right upper body: No supraclavicular or axillary adenopathy  Left upper body: No supraclavicular or axillary adenopathy  Lower Body: No right inguinal adenopathy  No left inguinal adenopathy     Skin:     General: Skin is warm and dry  Neurological:      Mental Status: She is alert and oriented to person, place, and time  Psychiatric:         Mood and Affect: Mood normal          Behavior: Behavior normal  Behavior is cooperative  Thought Content:  Thought content normal          Judgment: Judgment normal

## 2023-03-07 LAB
C TRACH DNA SPEC QL NAA+PROBE: NEGATIVE
CANDIDA RRNA VAG QL PROBE: NEGATIVE
G VAGINALIS RRNA GENITAL QL PROBE: POSITIVE
HPV HR 12 DNA CVX QL NAA+PROBE: POSITIVE
HPV16 DNA CVX QL NAA+PROBE: NEGATIVE
HPV18 DNA CVX QL NAA+PROBE: NEGATIVE
N GONORRHOEA DNA SPEC QL NAA+PROBE: NEGATIVE
T VAGINALIS RRNA GENITAL QL PROBE: NEGATIVE

## 2023-03-08 ENCOUNTER — TELEPHONE (OUTPATIENT)
Dept: OBGYN CLINIC | Facility: CLINIC | Age: 33
End: 2023-03-08

## 2023-03-08 DIAGNOSIS — B96.89 BV (BACTERIAL VAGINOSIS): Primary | ICD-10-CM

## 2023-03-08 DIAGNOSIS — N76.0 BV (BACTERIAL VAGINOSIS): Primary | ICD-10-CM

## 2023-03-08 RX ORDER — METRONIDAZOLE 500 MG/1
500 TABLET ORAL EVERY 12 HOURS SCHEDULED
Qty: 14 TABLET | Refills: 0 | Status: SHIPPED | OUTPATIENT
Start: 2023-03-08 | End: 2023-03-15

## 2023-03-08 NOTE — TELEPHONE ENCOUNTER
Spoke with patient regarding results  GC/chlamydia screening negative  Vaginal culture positive for BV  Rx for metronidazole 500 mg BID x 7 days sent to pharmacy  Instructed patient to avoid alcohol while on abx  HPV testing positive for other HR HPV; Pap still pending  Answered patient's questions  F/u to depend on Pap results  Call with problems

## 2023-03-14 LAB
LAB AP GYN PRIMARY INTERPRETATION: NORMAL
Lab: NORMAL
PATH INTERP SPEC-IMP: NORMAL

## 2023-09-13 ENCOUNTER — APPOINTMENT (EMERGENCY)
Dept: RADIOLOGY | Facility: HOSPITAL | Age: 33
End: 2023-09-13
Payer: COMMERCIAL

## 2023-09-13 ENCOUNTER — HOSPITAL ENCOUNTER (EMERGENCY)
Facility: HOSPITAL | Age: 33
Discharge: HOME | End: 2023-09-13
Attending: EMERGENCY MEDICINE | Admitting: EMERGENCY MEDICINE
Payer: COMMERCIAL

## 2023-09-13 VITALS
HEART RATE: 88 BPM | TEMPERATURE: 98.4 F | OXYGEN SATURATION: 98 % | SYSTOLIC BLOOD PRESSURE: 129 MMHG | RESPIRATION RATE: 16 BRPM | DIASTOLIC BLOOD PRESSURE: 82 MMHG

## 2023-09-13 DIAGNOSIS — B34.9 VIRAL SYNDROME: Primary | ICD-10-CM

## 2023-09-13 LAB
FLUAV RNA RESP QL NAA+PROBE: NEGATIVE
FLUBV RNA RESP QL NAA+PROBE: NEGATIVE
RSV RNA RESP QL NAA+PROBE: NEGATIVE
S PYO DNA THROAT QL NAA+PROBE: NOT DETECTED
SARS-COV-2 RNA RESP QL NAA+PROBE: NEGATIVE

## 2023-09-13 PROCEDURE — 0241U HB NFCT DS VIR RESP RNA 4 TRGT: CPT | Performed by: EMERGENCY MEDICINE

## 2023-09-13 PROCEDURE — 99285 EMERGENCY DEPT VISIT HI MDM: CPT | Performed by: EMERGENCY MEDICINE

## 2023-09-13 PROCEDURE — 71045 X-RAY EXAM CHEST 1 VIEW: CPT

## 2023-09-13 PROCEDURE — 99283 EMERGENCY DEPT VISIT LOW MDM: CPT

## 2023-09-13 PROCEDURE — 87651 STREP A DNA AMP PROBE: CPT | Performed by: EMERGENCY MEDICINE

## 2023-09-13 RX ORDER — DEXAMETHASONE 4 MG/1
10 TABLET ORAL ONCE
Status: COMPLETED | OUTPATIENT
Start: 2023-09-13 | End: 2023-09-13

## 2023-09-13 RX ORDER — IBUPROFEN 400 MG/1
400 TABLET ORAL ONCE
Status: COMPLETED | OUTPATIENT
Start: 2023-09-13 | End: 2023-09-13

## 2023-09-13 RX ORDER — ACETAMINOPHEN 325 MG/1
975 TABLET ORAL ONCE
Status: COMPLETED | OUTPATIENT
Start: 2023-09-13 | End: 2023-09-13

## 2023-09-13 RX ADMIN — ACETAMINOPHEN 975 MG: 325 TABLET, FILM COATED ORAL at 21:23

## 2023-09-13 RX ADMIN — IBUPROFEN 400 MG: 400 TABLET, FILM COATED ORAL at 21:23

## 2023-09-13 RX ADMIN — DEXAMETHASONE 10 MG: 4 TABLET ORAL at 21:23

## 2023-09-13 NOTE — Clinical Note
Colin Da Silvaer was seen and treated in our emergency department on 9/13/2023. Diagnosis:     Benny Cee  may return to work on return date. She may return on this date: 09/15/2023         If you have any questions or concerns, please don't hesitate to call.       Bianca Wallace MD    ______________________________           _______________          _______________  Hospital Representative                              Date                                Time

## 2023-09-14 NOTE — ED PROVIDER NOTES
History  Chief Complaint   Patient presents with   • Flu Symptoms     Reports 3 days of fevers and fatigue. Has been taking tylenol and cold medicine but isn't improving. Today felt she became sob. 55-year-old female with history of asthma, depression who presents for evaluation of viral symptoms. Patient reports onset of symptoms 4 days ago. She has had congestion, cough, sore throat, body aches. She had nausea and vomiting at the onset of her symptoms which is subsequently resolved. She has had a few episodes of diarrhea. She has been able to eat and drink normally. She otherwise denies chest pain, abdominal pain. She has had fevers as recently as this morning. She has been taking Tylenol cold and flu with minimal relief of her symptoms. No known sick contacts. Prior to Admission Medications   Prescriptions Last Dose Informant Patient Reported?  Taking?   acetaminophen (TYLENOL) 650 mg CR tablet   No No   Sig: Take 1 tablet (650 mg total) by mouth every 8 (eight) hours as needed for mild pain   Patient not taking: Reported on 3/6/2023   albuterol (PROVENTIL HFA,VENTOLIN HFA) 90 mcg/act inhaler   Yes No   Sig: Inhale 1 puff every 6 (six) hours as needed   ibuprofen (MOTRIN) 600 mg tablet   No No   Sig: Take 1 tablet (600 mg total) by mouth every 6 (six) hours as needed for mild pain   Patient not taking: Reported on 3/6/2023   ibuprofen (MOTRIN) 600 mg tablet   No No   Sig: Take 1 tablet (600 mg total) by mouth every 6 (six) hours as needed for mild pain   Patient not taking: Reported on 3/6/2023      Facility-Administered Medications: None       Past Medical History:   Diagnosis Date   • Asthma    • COVID    • Depression Not sure    Havent been feeling my normal happy self       Past Surgical History:   Procedure Laterality Date   • IA LAPAROSCOPY W/RMVL ADNEXAL STRUCTURES Bilateral 10/17/2022    Procedure: Jazmín Pruitt BILATERAL SALPINGECTOMY, LAPAROSCOPIC;  Surgeon: Vonn Kocher, MD;  Location: [facility];  Service: Gynecology   • PA REMOVAL INTRAUTERINE DEVICE IUD N/A 10/17/2022    Procedure: REMOVAL OF INTRAUTERINE DEVICE (IUD); Surgeon: Jose Marquez MD;  Location:  MAIN OR;  Service: Gynecology   • REDUCTION MAMMAPLASTY         Family History   Problem Relation Age of Onset   • Diabetes Mother    • Prostate cancer Father    • Breast cancer Paternal Aunt      I have reviewed and agree with the history as documented. E-Cigarette/Vaping   • E-Cigarette Use Never User      E-Cigarette/Vaping Substances     Social History     Tobacco Use   • Smoking status: Never   • Smokeless tobacco: Never   Vaping Use   • Vaping Use: Never used   Substance Use Topics   • Alcohol use: Yes     Alcohol/week: 3.0 standard drinks of alcohol     Types: 3 Glasses of wine per week     Comment: socially   • Drug use: Never       Review of Systems   Constitutional: Positive for fever. HENT: Positive for congestion, ear pain and sore throat. Respiratory: Positive for cough and shortness of breath. Cardiovascular: Negative for chest pain. Gastrointestinal: Positive for diarrhea, nausea and vomiting. Negative for abdominal pain. Genitourinary: Negative for dysuria, flank pain and frequency. Musculoskeletal: Positive for myalgias. Negative for gait problem. Skin: Negative for rash. Neurological: Negative for weakness and light-headedness. All other systems reviewed and are negative. Physical Exam  Physical Exam  Vitals and nursing note reviewed. Constitutional:       General: She is not in acute distress. Appearance: She is well-developed. She is not ill-appearing. HENT:      Head: Normocephalic and atraumatic. Right Ear: Tympanic membrane normal.      Left Ear: Tympanic membrane normal.      Nose: Congestion present. Mouth/Throat:      Mouth: Mucous membranes are moist.      Pharynx: No oropharyngeal exudate or posterior oropharyngeal erythema.    Eyes:      Conjunctiva/sclera: Conjunctivae normal.   Cardiovascular:      Rate and Rhythm: Normal rate and regular rhythm. Heart sounds: No murmur heard. No friction rub. No gallop. Pulmonary:      Effort: Pulmonary effort is normal.      Breath sounds: Normal breath sounds. No wheezing, rhonchi or rales. Abdominal:      General: There is no distension. Palpations: Abdomen is soft. Tenderness: There is no abdominal tenderness. Musculoskeletal:         General: No swelling or tenderness. Normal range of motion. Cervical back: Normal range of motion and neck supple. Skin:     General: Skin is warm and dry. Coloration: Skin is not pale. Findings: No rash. Neurological:      General: No focal deficit present. Mental Status: She is alert and oriented to person, place, and time.    Psychiatric:         Behavior: Behavior normal.         Vital Signs  ED Triage Vitals   Temperature Pulse Respirations Blood Pressure SpO2   09/13/23 2053 09/13/23 2053 09/13/23 2053 09/13/23 2053 09/13/23 2053   98.4 °F (36.9 °C) 88 16 129/82 98 %      Temp Source Heart Rate Source Patient Position - Orthostatic VS BP Location FiO2 (%)   09/13/23 2053 09/13/23 2053 09/13/23 2053 09/13/23 2053 --   Oral Monitor Lying Right arm       Pain Score       09/13/23 2123       8           Vitals:    09/13/23 2053   BP: 129/82   Pulse: 88   Patient Position - Orthostatic VS: Lying         Visual Acuity      ED Medications  Medications   dexamethasone (DECADRON) tablet 10 mg (10 mg Oral Given 9/13/23 2123)   acetaminophen (TYLENOL) tablet 975 mg (975 mg Oral Given 9/13/23 2123)   ibuprofen (MOTRIN) tablet 400 mg (400 mg Oral Given 9/13/23 2123)       Diagnostic Studies  Results Reviewed     Procedure Component Value Units Date/Time    FLU/RSV/COVID - if FLU/RSV clinically relevant [[id number]]  (Normal) Collected: 09/13/23 2121    Lab Status: Final result Specimen: Nares from Nose Updated: 09/13/23 2205     SARS-CoV-2 Negative INFLUENZA A PCR Negative     INFLUENZA B PCR Negative     RSV PCR Negative    Narrative:      FOR PEDIATRIC PATIENTS - copy/paste COVID Guidelines URL to browser: [email or web address]    SARS-CoV-2 assay is a Nucleic Acid Amplification assay intended for the  qualitative detection of nucleic acid from SARS-CoV-2 in nasopharyngeal  swabs. Results are for the presumptive identification of SARS-CoV-2 RNA. Positive results are indicative of infection with SARS-CoV-2, the virus  causing COVID-19, but do not rule out bacterial infection or co-infection  with other viruses. Laboratories within the [facility] and its  territories are required to report all positive results to the appropriate  public health authorities. Negative results do not preclude SARS-CoV-2  infection and should not be used as the sole basis for treatment or other  patient management decisions. Negative results must be combined with  clinical observations, patient history, and epidemiological information. This test has not been FDA cleared or approved. This test has been authorized by FDA under an Emergency Use Authorization  (EUA). This test is only authorized for the duration of time the  declaration that circumstances exist justifying the authorization of the  emergency use of an in vitro diagnostic tests for detection of SARS-CoV-2  virus and/or diagnosis of COVID-19 infection under section 564(b)(1) of  the Act, 21 U. S.C. 501KSK-4(X)(8), unless the authorization is terminated  or revoked sooner. The test has been validated but independent review by FDA  and CLIA is pending. Test performed using [employer] GeneXpert: This RT-PCR assay targets N2,  a region unique to SARS-CoV-2. A conserved region in the E-gene was chosen  for pan-Sarbecovirus detection which includes SARS-CoV-2. According to CMS-2020-01-R, this platform meets the definition of high-throughput technology.     Strep A PCR [[id number]]  (Normal) Collected: 09/13/23 2121    Lab Status: Final result Specimen: Throat Updated: 09/13/23 2153     STREP A PCR Not Detected                 XR chest 1 view portable   ED Interpretation by Maximiliano Sultana MD (09/13 2204)   No acute cardiopulmonary abnormality. Independently interpreted by me. Procedures  Procedures         ED Course  ED Course as of 09/13/23 2225   Wed Sep 13, 2023   2155 STREP A PCR: Not Detected   1379 FLU/RSV/COVID - if FLU/RSV clinically relevant                               SBIRT 20yo+    Flowsheet Row Most Recent Value   Initial Alcohol Screen: US AUDIT-C     1. How often do you have a drink containing alcohol? 1 Filed at: 09/13/2023 2053   2. How many drinks containing alcohol do you have on a typical day you are drinking? 1 Filed at: 09/13/2023 2053   3a. Male UNDER 65: How often do you have five or more drinks on one occasion? 0 Filed at: 09/13/2023 2053   3b. FEMALE Any Age, or MALE 65+: How often do you have 4 or more drinks on one occassion? 0 Filed at: 09/13/2023 2053   Audit-C Score 2 Filed at: 09/13/2023 2053   DALLIN: How many times in the past year have you. .. Used an illegal drug or used a prescription medication for non-medical reasons? Never Filed at: 09/13/2023 2053                    Medical Decision Making  75-year-old female presenting for ration of viral symptoms. Vital signs stable on arrival.  Patient with an overall benign examination. Lungs clear. No respiratory distress. Saturating well on room air. Differential diagnoses include but not limited to viral syndrome, strep pharyngitis, pneumonia. X-ray without any acute pathology noted. Viral testing and strep testing negative. Patient treated symptomatically with improvement. Advised follow-up with primary care physician. Return precautions discussed. Viral syndrome: acute illness or injury  Amount and/or Complexity of Data Reviewed  Labs: ordered.  Decision-making details documented in ED Course. Radiology: ordered and independent interpretation performed. Risk  OTC drugs. Prescription drug management. Disposition  Final diagnoses:   Viral syndrome     Time reflects when diagnosis was documented in both MDM as applicable and the Disposition within this note     Time User Action Codes Description Comment    9/13/2023 10:05 PM Lynn Ochoa Beth [B34.9] Viral syndrome       ED Disposition     ED Disposition   Discharge    Condition   Stable    Date/Time   Wed Sep 13, 2023 10:05 PM    Comment   [address] discharge to home/self care. Follow-up Information    None         Patient's Medications   Discharge Prescriptions    No medications on file       No discharge procedures on file.     PDMP Review     None          ED Provider  Electronically Signed by           Virginia Guillory MD  09/13/23 8301

## 2023-09-14 NOTE — DISCHARGE INSTRUCTIONS
You can continue taking Tylenol and Motrin every 6 hours as needed for pain or fevers. Stay well-hydrated. Continue using your inhaler as prescribed. Please return to the emergency department if you develop worsening symptoms, severe pain, difficulty breathing, or anything else concerning to you.

## 2024-08-14 ENCOUNTER — TELEPHONE (OUTPATIENT)
Age: 34
End: 2024-08-14

## 2024-08-14 NOTE — TELEPHONE ENCOUNTER
Adolph called in for her child wood immunization records and mentioned she spoke to Ad from the practice a while ago and was given a third party number to contact but is not working. So therefore nessa transferred the call to practice and was answered by Ad took the patient call. Thanks

## 2024-10-02 ENCOUNTER — OFFICE VISIT (OUTPATIENT)
Dept: OBGYN CLINIC | Facility: CLINIC | Age: 34
End: 2024-10-02
Payer: COMMERCIAL

## 2024-10-02 ENCOUNTER — NURSE TRIAGE (OUTPATIENT)
Age: 34
End: 2024-10-02

## 2024-10-02 VITALS
DIASTOLIC BLOOD PRESSURE: 62 MMHG | BODY MASS INDEX: 26.58 KG/M2 | HEIGHT: 63 IN | WEIGHT: 150 LBS | SYSTOLIC BLOOD PRESSURE: 106 MMHG

## 2024-10-02 DIAGNOSIS — N39.0 URINARY TRACT INFECTION WITHOUT HEMATURIA, SITE UNSPECIFIED: Primary | ICD-10-CM

## 2024-10-02 PROCEDURE — 99213 OFFICE O/P EST LOW 20 MIN: CPT | Performed by: OBSTETRICS & GYNECOLOGY

## 2024-10-02 PROCEDURE — 87077 CULTURE AEROBIC IDENTIFY: CPT | Performed by: OBSTETRICS & GYNECOLOGY

## 2024-10-02 PROCEDURE — 87086 URINE CULTURE/COLONY COUNT: CPT | Performed by: OBSTETRICS & GYNECOLOGY

## 2024-10-02 PROCEDURE — 87186 SC STD MICRODIL/AGAR DIL: CPT | Performed by: OBSTETRICS & GYNECOLOGY

## 2024-10-02 RX ORDER — NITROFURANTOIN 25; 75 MG/1; MG/1
100 CAPSULE ORAL 2 TIMES DAILY
Qty: 14 CAPSULE | Refills: 0 | Status: SHIPPED | OUTPATIENT
Start: 2024-10-02 | End: 2024-10-09

## 2024-10-02 NOTE — TELEPHONE ENCOUNTER
"Patient transferred to RN by PEP after scheduling annual visit, due to concerns for cloudy, strong/foul odor urine, intermittent pelvic pressure with urination the last month. Denies pain with urination, fever, or blood in urine. Does note intermittent lower back pain. RN offered to have urine labs orders placed to have patient go to outpatient lab to obtain, however, patient asking to come in for problem visit to be evaluated for UTI. RN scheduled patient to be seen later this afternoon for further evaluation by provider per patient's request. No further questions at this time.     Answer Assessment - Initial Assessment Questions  1. SYMPTOM: \"What's the main symptom you're concerned about?\" (e.g., frequency, incontinence)      Cloudy urine, intermittent pressure with urination, strong foul odor to urine  2. ONSET: \"When did the  s/s  start?\"      About a month ago  3. PAIN: \"Is there any pain?\" If Yes, ask: \"How bad is it?\" (Scale: 1-10; mild, moderate, severe)      Intermittent mild lower back pain  4. CAUSE: \"What do you think is causing the symptoms?\"      Possible UTI  5. OTHER SYMPTOMS: \"Do you have any other symptoms?\" (e.g., fever, flank pain, blood in urine, pain with urination)      Denies  6. PREGNANCY: \"Is there any chance you are pregnant?\" \"When was your last menstrual period?\"      Denies    Protocols used: Urinary Symptoms-ADULT-[place]    Reason for Disposition  • Patient wants to be seen    Protocols used: Urinary Symptoms-ADULT-[place]    "

## 2024-10-02 NOTE — PROGRESS NOTES
"Assessment/Plan:     There are no diagnoses linked to this encounter.        32-year-old female  UTI symptom  Had V notes bilateral salpingectomy  Plan   Macrobid  Urine culture  Return to office for annual exam    Subjective:      Patient ID: María Duncan is a 34 y.o. female.  34-year-old female present to the office today complaining of urgency frequency and dysuria    Urinary Tract Infection   Associated symptoms include frequency and urgency. Pertinent negatives include no chills, flank pain, hematuria, nausea or vomiting.       The following portions of the patient's history were reviewed and updated as appropriate: allergies, current medications, past family history, past medical history, past social history, past surgical history and problem list.    Review of Systems   Constitutional:  Negative for activity change, appetite change, chills, fatigue and fever.   Respiratory:  Negative for apnea, cough, chest tightness and shortness of breath.    Cardiovascular:  Negative for chest pain, palpitations and leg swelling.   Gastrointestinal:  Negative for abdominal pain, constipation, diarrhea, nausea and vomiting.   Genitourinary:  Positive for dysuria, frequency and urgency. Negative for difficulty urinating, flank pain, hematuria and vaginal discharge.   Neurological:  Negative for dizziness, seizures, syncope, light-headedness, numbness and headaches.   Psychiatric/Behavioral:  Negative for agitation and confusion.          Objective:      /62 (BP Location: Left arm, Patient Position: Sitting, Cuff Size: Adult)   Ht 5' 3\" (1.6 m)   Wt 68 kg (150 lb)   BMI 26.57 kg/m²       .  Positive nitrate and leukocyte noted     Physical Exam  Constitutional:       Appearance: Normal appearance.   Abdominal:      Palpations: Abdomen is soft.      Tenderness: There is no right CVA tenderness, left CVA tenderness, guarding or rebound.   Neurological:      General: No focal deficit present.      Mental Status: She " is alert and oriented to person, place, and time.   Psychiatric:         Mood and Affect: Mood normal.         Behavior: Behavior normal.

## 2024-10-04 ENCOUNTER — TELEPHONE (OUTPATIENT)
Age: 34
End: 2024-10-04

## 2024-10-04 NOTE — TELEPHONE ENCOUNTER
Pt returned a call. RN provided recommendations by provider. Pt agreeable to plan. Noted that currently feeling better and symptoms improving. Aware to call back if symptoms do not fully resolve or worsen after completion of treatment. No further questions.

## 2024-10-04 NOTE — TELEPHONE ENCOUNTER
----- Message from Harley Mendez MD sent at 10/4/2024 12:43 PM EDT -----  Urine culture positive treated at the visit we will notify patient if any antibiotics change needed to continue current antibiotics for now

## 2024-10-05 LAB — BACTERIA UR CULT: ABNORMAL

## 2024-12-12 ENCOUNTER — ANNUAL EXAM (OUTPATIENT)
Dept: OBGYN CLINIC | Facility: CLINIC | Age: 34
End: 2024-12-12
Payer: COMMERCIAL

## 2024-12-12 VITALS
WEIGHT: 153 LBS | HEIGHT: 63 IN | DIASTOLIC BLOOD PRESSURE: 80 MMHG | SYSTOLIC BLOOD PRESSURE: 124 MMHG | BODY MASS INDEX: 27.11 KG/M2

## 2024-12-12 DIAGNOSIS — Z11.3 SCREENING FOR STDS (SEXUALLY TRANSMITTED DISEASES): ICD-10-CM

## 2024-12-12 DIAGNOSIS — Z11.3 SCREEN FOR STD (SEXUALLY TRANSMITTED DISEASE): ICD-10-CM

## 2024-12-12 DIAGNOSIS — R39.9 UTI SYMPTOMS: ICD-10-CM

## 2024-12-12 DIAGNOSIS — Z01.419 ENCOUNTER FOR GYNECOLOGICAL EXAMINATION WITHOUT ABNORMAL FINDING: Primary | ICD-10-CM

## 2024-12-12 LAB
SL AMB  POCT GLUCOSE, UA: ABNORMAL
SL AMB LEUKOCYTE ESTERASE,UA: ABNORMAL
SL AMB POCT BILIRUBIN,UA: ABNORMAL
SL AMB POCT BLOOD,UA: ABNORMAL
SL AMB POCT CLARITY,UA: ABNORMAL
SL AMB POCT COLOR,UA: ABNORMAL
SL AMB POCT KETONES,UA: ABNORMAL
SL AMB POCT NITRITE,UA: ABNORMAL
SL AMB POCT PH,UA: ABNORMAL
SL AMB POCT SPECIFIC GRAVITY,UA: 1.02
SL AMB POCT URINE PROTEIN: ABNORMAL
SL AMB POCT UROBILINOGEN: ABNORMAL

## 2024-12-12 PROCEDURE — G0145 SCR C/V CYTO,THINLAYER,RESCR: HCPCS | Performed by: OBSTETRICS & GYNECOLOGY

## 2024-12-12 PROCEDURE — 87591 N.GONORRHOEAE DNA AMP PROB: CPT | Performed by: OBSTETRICS & GYNECOLOGY

## 2024-12-12 PROCEDURE — G0476 HPV COMBO ASSAY CA SCREEN: HCPCS | Performed by: OBSTETRICS & GYNECOLOGY

## 2024-12-12 PROCEDURE — 81002 URINALYSIS NONAUTO W/O SCOPE: CPT | Performed by: OBSTETRICS & GYNECOLOGY

## 2024-12-12 PROCEDURE — 87086 URINE CULTURE/COLONY COUNT: CPT | Performed by: OBSTETRICS & GYNECOLOGY

## 2024-12-12 PROCEDURE — 87186 SC STD MICRODIL/AGAR DIL: CPT | Performed by: OBSTETRICS & GYNECOLOGY

## 2024-12-12 PROCEDURE — 87661 TRICHOMONAS VAGINALIS AMPLIF: CPT | Performed by: OBSTETRICS & GYNECOLOGY

## 2024-12-12 PROCEDURE — 87563 M. GENITALIUM AMP PROBE: CPT | Performed by: OBSTETRICS & GYNECOLOGY

## 2024-12-12 PROCEDURE — 99395 PREV VISIT EST AGE 18-39: CPT | Performed by: OBSTETRICS & GYNECOLOGY

## 2024-12-12 PROCEDURE — 87491 CHLMYD TRACH DNA AMP PROBE: CPT | Performed by: OBSTETRICS & GYNECOLOGY

## 2024-12-12 PROCEDURE — 87077 CULTURE AEROBIC IDENTIFY: CPT | Performed by: OBSTETRICS & GYNECOLOGY

## 2024-12-12 RX ORDER — NITROFURANTOIN 25; 75 MG/1; MG/1
100 CAPSULE ORAL 2 TIMES DAILY
Qty: 14 CAPSULE | Refills: 0 | Status: SHIPPED | OUTPATIENT
Start: 2024-12-12 | End: 2024-12-19

## 2024-12-12 NOTE — PROGRESS NOTES
"Mariela Duncan is a 34 y.o. female who presents for annual well woman exam. Periods are regular every 28-30 days, lasting 5 days. No intermenstrual bleeding, spotting, or discharge.    Current contraception: tubal ligation  History of abnormal Pap smear: no  Family history of uterine or ovarian cancer: no  Father colon cancer  61 yo  Family history of breast cancer: paternal aunt    Menstrual History:  OB History          4    Para   3    Term                AB   1    Living   3         SAB   1    IAB        Ectopic        Multiple        Live Births                      No LMP recorded.       The following portions of the patient's history were reviewed and updated as appropriate: allergies, current medications, past family history, past medical history, past social history, past surgical history, and problem list.    Review of Systems  Review of Systems   Constitutional:  Positive for fatigue. Negative for activity change, appetite change, chills and fever.   Respiratory:  Negative for apnea, cough, chest tightness and shortness of breath.    Cardiovascular:  Negative for chest pain, palpitations and leg swelling.   Gastrointestinal:  Negative for abdominal pain, constipation, diarrhea, nausea and vomiting.   Genitourinary:  Positive for urgency. Negative for difficulty urinating, dysuria, flank pain, frequency and hematuria.   Neurological:  Negative for dizziness, seizures, syncope, light-headedness, numbness and headaches.   Psychiatric/Behavioral:  Negative for agitation and confusion.           Objective      /80 (BP Location: Left arm, Patient Position: Sitting, Cuff Size: Adult)   Ht 5' 3\" (1.6 m)   Wt 69.4 kg (153 lb)   BMI 27.10 kg/m²     Physical Exam  OBGyn Exam     General:   alert and oriented, in no acute distress, alert, appears stated age, and cooperative   Heart: regular rate and rhythm, S1, S2 normal, no murmur, click, rub or gallop   Lungs: clear to " auscultation bilaterally   Abdomen: soft, non-tender, without masses or organomegaly   Vulva: normal   Vagina: normal mucosa, normal discharge   Cervix: no cervical motion tenderness and no lesions   Uterus: normal size   Adnexa:  Breast Exam:  normal adnexa  breasts appear normal, no suspicious masses, no skin or nipple changes or axillary nodes.                Assessment      @well woman@ .   34-year-old female  Annual exam  Desires STD check/UTI symptom  Prior 3 vaginal delivery  Had V notes bilateral salpingectomy  Plan   Pap/HPV  Diet/exercise  Calcium/vitamin D  GC/CT/trichomoniasis  Lab for STI  Condom with sexual activity  Return to office for annual exam  Macrobid secondary to UTI symptoms urine culture     All questions answered.     There are no Patient Instructions on file for this visit.

## 2024-12-14 LAB
C TRACH DNA SPEC QL NAA+PROBE: NEGATIVE
N GONORRHOEA DNA SPEC QL NAA+PROBE: NEGATIVE

## 2024-12-15 ENCOUNTER — RESULTS FOLLOW-UP (OUTPATIENT)
Dept: OBGYN CLINIC | Facility: CLINIC | Age: 34
End: 2024-12-15

## 2024-12-15 LAB
BACTERIA UR CULT: ABNORMAL
M GENITALIUM DNA SPEC QL NAA+PROBE: NEGATIVE
T VAGINALIS DNA SPEC QL NAA+PROBE: NEGATIVE

## 2024-12-19 LAB
LAB AP GYN PRIMARY INTERPRETATION: NORMAL
Lab: NORMAL
PATH INTERP SPEC-IMP: NORMAL

## 2024-12-23 NOTE — TELEPHONE ENCOUNTER
Pt returned call. Reviewed pap smear and HPV were negative. Pt verbalized understanding and is thankful.

## 2025-05-18 ENCOUNTER — HOSPITAL ENCOUNTER (EMERGENCY)
Facility: HOSPITAL | Age: 35
Discharge: HOME | End: 2025-05-18
Attending: EMERGENCY MEDICINE | Admitting: EMERGENCY MEDICINE
Payer: COMMERCIAL

## 2025-05-18 VITALS
TEMPERATURE: 98.9 F | HEART RATE: 83 BPM | RESPIRATION RATE: 16 BRPM | DIASTOLIC BLOOD PRESSURE: 80 MMHG | SYSTOLIC BLOOD PRESSURE: 127 MMHG | OXYGEN SATURATION: 98 %

## 2025-05-18 DIAGNOSIS — M79.672 LEFT FOOT PAIN: Primary | ICD-10-CM

## 2025-05-18 PROCEDURE — 99282 EMERGENCY DEPT VISIT SF MDM: CPT

## 2025-05-18 PROCEDURE — 99284 EMERGENCY DEPT VISIT MOD MDM: CPT | Performed by: PHYSICIAN ASSISTANT

## 2025-05-18 RX ORDER — MELOXICAM 15 MG/1
15 TABLET ORAL DAILY
Qty: 10 TABLET | Refills: 0 | Status: SHIPPED | OUTPATIENT
Start: 2025-05-18

## 2025-05-18 NOTE — DISCHARGE INSTRUCTIONS
Please return to the emergency department for worsening symptoms including chest pain, shortness of breath, dizziness, lightheadedness, fever greater than 103, severe pain, inability to walk, fainting episodes, etc..  Please follow-up with your family practice provider as soon as possible.  I have sent medications over to the pharmacy for your symptoms.  Please take as directed.  I recommend freezing a water bottle and rolling your foot on it.  This may help with the pain and swelling.  You may also put a dish towel on the ground and scrunch your toes to  the towel to help exercise your foot.  If these do not help your symptoms in addition to the medication in the next 2 to 4 weeks, you may follow-up with a podiatrist for further evaluation for plantar fasciitis.

## 2025-05-19 NOTE — ED PROVIDER NOTES
"Time reflects when diagnosis was documented in both MDM as applicable and the Disposition within this note       Time User Action Codes Description Comment    5/18/2025  2:59 PM Cody Castellanos [M79.672] Left foot pain           ED Disposition       ED Disposition   Discharge    Condition   Stable    Date/Time   Sun May 18, 2025  2:59 PM    Comment   María Duncan discharge to home/self care.                   Assessment & Plan       Medical Decision Making  35-year-old female presenting to the emergency department today for pain to the plantar aspect of the right foot and to the right heel.  Ongoing for numerous months.  No discernible trauma.  Worse the first few steps she takes in the morning.  Vitals are stable.  Afebrile.  On physical examination, the patient has tenderness to palpation to the plantar aspect of the right foot without any swelling, warmth, or redness.  This sounds like plantar fasciitis.  The patient is stable for discharge at this time.  The patient was given exercises that she can use at home to help with symptoms.  NSAID sent to the patient's pharmacy.  Recommended outpatient follow-up with podiatry.  Referral placed.  Strict return precautions were given.  Recommend PCP follow-up as soon as possible. The patient and/or patient's proxy verify their understanding and agree to the plan at this time.  All questions answered to the patient and/or their proxy's satisfaction.  All labs reviewed and utilized in the medical decision making process (if labs were ordered).  Portions of the record may have been created with voice recognition software.  Occasional wrong word or \"sound a like\" substitutions may have occurred due to the inherent limitations of voice recognition software.  Read the chart carefully and recognize, using context, where substitutions have occurred.    Problems Addressed:  Left foot pain: undiagnosed new problem with uncertain prognosis    Risk  Prescription drug " management.             Medications - No data to display    ED Risk Strat Scores                    No data recorded        SBIRT 22yo+      Flowsheet Row Most Recent Value   Initial Alcohol Screen: US AUDIT-C     1. How often do you have a drink containing alcohol? 1 Filed at: 05/18/2025 5098   Audit-C Score 1 Filed at: 05/18/2025 1452   DALLIN: How many times in the past year have you...    Used an illegal drug or used a prescription medication for non-medical reasons? Never Filed at: 05/18/2025 3630                            History of Present Illness       Chief Complaint   Patient presents with    Foot Pain     Pt reports starting with left heel pain a few months ago that has now spread to the top of her foot and has difficulty bearing weight. States she thought it was from the shoe she was wearing so she changed her shoes without relief. Denies any injury.        Past Medical History:   Diagnosis Date    Asthma     COVID     Depression Not sure    Havent been feeling my normal happy self      Past Surgical History:   Procedure Laterality Date    IL LAPAROSCOPY W/RMVL ADNEXAL STRUCTURES Bilateral 10/17/2022    Procedure: VNOTE BILATERAL SALPINGECTOMY, LAPAROSCOPIC;  Surgeon: Harley Mendez MD;  Location:  MAIN OR;  Service: Gynecology    IL REMOVAL INTRAUTERINE DEVICE IUD N/A 10/17/2022    Procedure: REMOVAL OF INTRAUTERINE DEVICE (IUD);  Surgeon: Harley Mendez MD;  Location:  MAIN OR;  Service: Gynecology    REDUCTION MAMMAPLASTY        Family History   Problem Relation Age of Onset    Diabetes Mother     Prostate cancer Father     Breast cancer Paternal Aunt       Social History[1]   E-Cigarette/Vaping    E-Cigarette Use Never User       E-Cigarette/Vaping Substances      I have reviewed and agree with the history as documented.     This is a 35-year-old female with no significant past medical history presenting to the emergency department today with pain to the plantar aspect of her foot and heel.  This  has been ongoing for a few months.  She initially thought that it was secondary to having tight shoes so she recently changed her shoes without any change in symptoms.  She notes pain to her right heel that is worse with the first few steps she takes in the morning.  It does not radiate up the leg.  She denies any injuries to the foot.  She denies any falls.  No medications prior to arrival.  No lower extremity swelling.  No other complaints at this time.      History provided by:  Patient   used: No    Leg Pain  Location:  Foot  Time since incident: numerous months.  Injury: no    Foot location:  Sole of R foot  Chronicity:  Chronic  Tetanus status:  Unknown  Prior injury to area:  No  Relieved by:  Nothing  Exacerbated by: bearing weight.  Ineffective treatments:  None tried  Associated symptoms: no back pain, no decreased ROM, no fatigue, no fever, no itching, no muscle weakness, no neck pain, no numbness, no stiffness, no swelling and no tingling        Review of Systems   Constitutional:  Negative for appetite change, chills, diaphoresis, fatigue and fever.   Eyes:  Negative for visual disturbance.   Respiratory:  Negative for cough, chest tightness, shortness of breath and wheezing.    Cardiovascular:  Negative for chest pain, palpitations and leg swelling.   Gastrointestinal:  Negative for abdominal pain, constipation, diarrhea, nausea and vomiting.   Musculoskeletal:  Negative for back pain, neck pain, neck stiffness and stiffness.   Skin:  Negative for itching, rash and wound.   Neurological:  Negative for dizziness, seizures, syncope, weakness, light-headedness, numbness and headaches.   Psychiatric/Behavioral:  Negative for confusion.    All other systems reviewed and are negative.          Objective       ED Triage Vitals [05/18/25 1449]   Temperature Pulse Blood Pressure Respirations SpO2 Patient Position - Orthostatic VS   98.9 °F (37.2 °C) 83 127/80 16 98 % Sitting      Temp Source  Heart Rate Source BP Location FiO2 (%) Pain Score    Oral Monitor Left arm -- --      Vitals      Date and Time Temp Pulse SpO2 Resp BP Pain Score FACES Pain Rating User   05/18/25 1449 98.9 °F (37.2 °C) 83 98 % 16 127/80 -- -- CF            Physical Exam  Vitals and nursing note reviewed.   Constitutional:       General: She is not in acute distress.     Appearance: Normal appearance. She is normal weight. She is not ill-appearing, toxic-appearing or diaphoretic.   HENT:      Head: Normocephalic and atraumatic.      Nose: Nose normal. No congestion or rhinorrhea.      Mouth/Throat:      Mouth: Mucous membranes are moist.      Pharynx: No oropharyngeal exudate or posterior oropharyngeal erythema.     Eyes:      General: No scleral icterus.        Right eye: No discharge.         Left eye: No discharge.      Extraocular Movements: Extraocular movements intact.      Pupils: Pupils are equal, round, and reactive to light.       Cardiovascular:      Rate and Rhythm: Normal rate and regular rhythm.      Pulses: Normal pulses.      Heart sounds: Normal heart sounds. No murmur heard.     No friction rub. No gallop.      Comments: 2+ DP pulses bilaterally  Pulmonary:      Effort: Pulmonary effort is normal. No respiratory distress.      Breath sounds: Normal breath sounds. No stridor. No wheezing, rhonchi or rales.   Chest:      Chest wall: No tenderness.     Musculoskeletal:         General: Normal range of motion.      Cervical back: Normal range of motion. No tenderness.      Right lower leg: No edema.      Left lower leg: No edema.      Comments: Tenderness to palpation to the plantar aspect of the right foot just anterior to the right heel.  There is no redness or swelling to this area.  There is no wound to this area.     Skin:     General: Skin is warm and dry.      Capillary Refill: Capillary refill takes less than 2 seconds.      Coloration: Skin is not jaundiced or pale.     Neurological:      General: No focal  deficit present.      Mental Status: She is alert and oriented to person, place, and time. Mental status is at baseline.     Psychiatric:         Mood and Affect: Mood normal.         Behavior: Behavior normal.         Results Reviewed       None            No orders to display       Procedures    ED Medication and Procedure Management   Prior to Admission Medications   Prescriptions Last Dose Informant Patient Reported? Taking?   acetaminophen (TYLENOL) 650 mg CR tablet   No No   Sig: Take 1 tablet (650 mg total) by mouth every 8 (eight) hours as needed for mild pain   albuterol (PROVENTIL HFA,VENTOLIN HFA) 90 mcg/act inhaler   Yes No   Sig: Inhale 1 puff every 6 (six) hours as needed   ibuprofen (MOTRIN) 600 mg tablet   No No   Sig: Take 1 tablet (600 mg total) by mouth every 6 (six) hours as needed for mild pain   Patient not taking: Reported on 12/12/2024   ibuprofen (MOTRIN) 600 mg tablet   No No   Sig: Take 1 tablet (600 mg total) by mouth every 6 (six) hours as needed for mild pain   Patient not taking: Reported on 12/12/2024      Facility-Administered Medications: None     Discharge Medication List as of 5/18/2025  3:01 PM        START taking these medications    Details   meloxicam (Mobic) 15 mg tablet Take 1 tablet (15 mg total) by mouth daily, Starting Sun 5/18/2025, Normal           CONTINUE these medications which have NOT CHANGED    Details   acetaminophen (TYLENOL) 650 mg CR tablet Take 1 tablet (650 mg total) by mouth every 8 (eight) hours as needed for mild pain, Starting Mon 10/17/2022, Normal      albuterol (PROVENTIL HFA,VENTOLIN HFA) 90 mcg/act inhaler Inhale 1 puff every 6 (six) hours as needed, Historical Med      !! ibuprofen (MOTRIN) 600 mg tablet Take 1 tablet (600 mg total) by mouth every 6 (six) hours as needed for mild pain, Starting Fri 8/12/2022, Normal      !! ibuprofen (MOTRIN) 600 mg tablet Take 1 tablet (600 mg total) by mouth every 6 (six) hours as needed for mild pain, Starting  Mon 10/17/2022, Normal       !! - Potential duplicate medications found. Please discuss with provider.          ED SEPSIS DOCUMENTATION   Time reflects when diagnosis was documented in both MDM as applicable and the Disposition within this note       Time User Action Codes Description Comment    5/18/2025  2:59 PM Cody Castellanos Add [M79.672] Left foot pain                    [1]   Social History  Tobacco Use    Smoking status: Never    Smokeless tobacco: Never   Vaping Use    Vaping status: Never Used   Substance Use Topics    Alcohol use: Yes     Alcohol/week: 3.0 standard drinks of alcohol     Types: 3 Glasses of wine per week     Comment: socially    Drug use: Yes     Types: Marijuana        Cody Castellanos PA-C  05/19/25 6833

## 2025-05-20 ENCOUNTER — OFFICE VISIT (OUTPATIENT)
Dept: PODIATRY | Facility: CLINIC | Age: 35
End: 2025-05-20

## 2025-05-20 VITALS — BODY MASS INDEX: 27.32 KG/M2 | WEIGHT: 154.2 LBS | HEIGHT: 63 IN

## 2025-05-20 DIAGNOSIS — M72.2 PLANTAR FASCIITIS, LEFT: ICD-10-CM

## 2025-05-20 DIAGNOSIS — M79.672 LEFT FOOT PAIN: Primary | ICD-10-CM

## 2025-05-20 RX ORDER — DEXAMETHASONE SODIUM PHOSPHATE 4 MG/ML
2 INJECTION, SOLUTION INTRA-ARTICULAR; INTRALESIONAL; INTRAMUSCULAR; INTRAVENOUS; SOFT TISSUE ONCE
Status: COMPLETED | OUTPATIENT
Start: 2025-05-20 | End: 2025-05-20

## 2025-05-20 RX ORDER — TRIAMCINOLONE ACETONIDE 40 MG/ML
20 INJECTION, SUSPENSION INTRA-ARTICULAR; INTRAMUSCULAR ONCE
Status: COMPLETED | OUTPATIENT
Start: 2025-05-20 | End: 2025-05-20

## 2025-05-20 RX ORDER — LIDOCAINE HYDROCHLORIDE 10 MG/ML
0.5 INJECTION, SOLUTION EPIDURAL; INFILTRATION; INTRACAUDAL; PERINEURAL ONCE
Status: COMPLETED | OUTPATIENT
Start: 2025-05-20 | End: 2025-05-20

## 2025-05-20 RX ADMIN — LIDOCAINE HYDROCHLORIDE 0.5 ML: 10 INJECTION, SOLUTION EPIDURAL; INFILTRATION; INTRACAUDAL; PERINEURAL at 11:29

## 2025-05-20 RX ADMIN — DEXAMETHASONE SODIUM PHOSPHATE 2 MG: 4 INJECTION, SOLUTION INTRA-ARTICULAR; INTRALESIONAL; INTRAMUSCULAR; INTRAVENOUS; SOFT TISSUE at 11:29

## 2025-05-20 RX ADMIN — TRIAMCINOLONE ACETONIDE 20 MG: 40 INJECTION, SUSPENSION INTRA-ARTICULAR; INTRAMUSCULAR at 11:30

## 2025-05-20 NOTE — LETTER
May 20, 2025     Patient: María Duncan  YOB: 1990  Date of Visit: 5/20/2025      To Whom it May Concern:    María Duncan is under my professional care. María was seen in my office on 5/20/2025. María may return to work on 5/21/25.    If you have any questions or concerns, please don't hesitate to call.         Sincerely,          Boyd Jean-Baptiste DPM        CC: No Recipients

## 2025-05-20 NOTE — PATIENT INSTRUCTIONS
My recommendation for over-the-counter inserts for you are:    ishBowl Melbeta Insoles    These can be obtained directly from the  at [email or web address]/avbwrpda-kzhxljoe-qodtuxt/products/pinnacle-insoles    You can also find these online at [employer], [employer] and other retailers.    Patient Education     Plantar Fasciitis Exercises   About this topic   Plantar fasciitis is swelling of the thick tissue on the bottom of the foot. This tissue is called the plantar fascia. It connects the heel bone to the toes and supports the arch of the foot. Exercise is an important part of making this problem better.  General   Before starting with a program, ask your doctor if you are healthy enough to do these exercises. Your doctor may have you work with a  or physical therapist to make a safe exercise program to meet your needs.  Stretching Exercises   Stretching exercises keep your muscles flexible. They also stop them from getting tight. Start by doing each of these stretches 2 to 3 times. In order for your body to make changes, you will need to hold these stretches for 20 to 30 seconds. Try to do the stretches 2 to 3 times each day. Do all exercises slowly.  Calf stretches standing ? Stand about 12 to 18 inches (30 to 45 cm) away from a wall. Place your hands on the wall at shoulder level. Lean forward.  Knee straight - Stretch your left leg straight behind you. Make sure the left heel is flat on the floor and the left knee is straight. Now, bend the knee of the right leg until you feel a stretch in your left calf. Be sure that the heel does not come up. Repeat on the other side.  Knee bent - Take a small step forward with your left leg so your feet are slightly closer together. With your right leg bent, bend your left knee forward until you feel a stretch in the back of the calf of your left leg. This will feel strange, but it is the best way to stretch this calf muscle. Repeat on the  other side.  Calf stretches lying down with belt or towel ? Lie on your back with both legs straight. Loop a belt or towel around the ball of one foot. Lift the leg up, keeping the knee straight until you feel a stretch in the back of your thigh. Pull back on the belt or towel to bend your foot more for a better stretch. Repeat on the other foot. This stretch gets both your calf and the hamstrings on the back of your thigh.  Calf stretches on stairs ? Stand on a step and hold onto a rail. Position your feet so only the balls of your feet are on the step. Lower both heels until you feel a stretch in the back of your calves. Do not do this stretch if you have trouble with balance.  Crossed leg foot stretches ? Sit in a chair. Bend one leg up and rest the outside of the foot on the knee. Grab your foot and pull your toes and foot forward until you feel a stretch along the bottom of your foot. Repeat with the other foot.  Rolling foot ? Use a golf ball, tennis ball, soup can, a frozen water bottle, or a rolling pin for this exercise. Sit in a sturdy chair. Put the ball, can, or rolling pin underneath your sore foot. Push down firmly and roll it back and forth with your foot for 1 to 2 minutes. Work up to 3 to 4 minutes. If this exercise is too easy, try doing it while standing up with more pressure on the foot. Do this exercise last if you use a frozen water bottle. Heated tissue stretches better than cold tissue. Doing this last with a frozen water bottle can help lessen the pain and swelling that may happen with stretching.  Strengthening Exercises   Strengthening exercises keep your muscles firm and strong. Start by repeating each exercise 2 to 3 times. Work up to doing each exercise 10 times. Try to do the exercises 2 to 3 times each day. Do all exercises slowly.  Towel pick-ups ? Sit in a chair with your feet flat on the floor. Make sure you do not have shoes or socks on your feet. Place a towel under one foot with  one end of the towel lined up with your heel. Keep your heel on the floor and try grabbing the towel with your toes. Gradually work it back until there is no more towel to move. Now, try pushing the towel back out while keeping your heel still. You can make this harder by putting a small weight on the end of the towel.               What will the results be?   Less pain  Less pulling  Less swelling  Better flexibility and range of motion  Easier to walk and do other activities  Helpful tips   Stay active and work out to keep your muscles strong and flexible.  Keep a healthy weight to avoid putting too much stress on your joints. Eat a healthy diet to keep your muscles healthy.  Be sure you do not hold your breath when exercising. This can raise your blood pressure. If you tend to hold your breath, try counting out loud when exercising. If any exercise bothers you, stop right away.  Always warm up before stretching. Heated muscles stretch much easier than cool muscles. Stretching cool muscles can lead to injury.  Try walking or cycling at an easy pace for a few minutes to warm up your muscles. Do this again after exercising.  Never bounce when doing stretches.  Doing exercises before a meal may be a good way to get into a routine.  After exercising, it is a good idea to ice the bottom of your foot. A good way to do this is by sitting in a chair and rolling a frozen water bottle back and forth under your foot. Do not do this longer than 15 to 20 minutes.  Exercise may be slightly uncomfortable, but you should not have sharp pains. If you do get sharp pains, stop what you are doing. If the sharp pains continue, call your doctor.  Last Reviewed Date   2021-05-19  Consumer Information Use and Disclaimer   This generalized information is a limited summary of diagnosis, treatment, and/or medication information. It is not meant to be comprehensive and should be used as a tool to help the user understand and/or assess  potential diagnostic and treatment options. It does NOT include all information about conditions, treatments, medications, side effects, or risks that may apply to a specific patient. It is not intended to be medical advice or a substitute for the medical advice, diagnosis, or treatment of a health care provider based on the health care provider's examination and assessment of a patient’s specific and unique circumstances. Patients must speak with a health care provider for complete information about their health, medical questions, and treatment options, including any risks or benefits regarding use of medications. This information does not endorse any treatments or medications as safe, effective, or approved for treating a specific patient. [employer] and its affiliates disclaim any warranty or liability relating to this information or the use thereof. The use of this information is governed by the Terms of Use, available at https://www.[employer].com/en/know/clinical-effectiveness-terms   Copyright   Copyright © 2024 [employer], Inc[employer] and its affiliates and/or licensors. All rights reserved.

## 2025-05-20 NOTE — PROGRESS NOTES
Name: María Duncan      : 1990      MRN: [id number]  Encounter Provider: Boyd Jean-Baptiste DPM  Encounter Date: 2025   Encounter department: [facility] [place]    Assessment & Plan     1. Left foot pain  -     XR foot 3+ vw left  -     dexamethasone (DECADRON) injection 2 mg  -     triamcinolone acetonide (Kenalog-40) 40 mg/mL injection 20 mg  -     lidocaine (PF) (XYLOCAINE-MPF) 1 % injection 0.5 mL  2. Plantar fasciitis, left  -     Foot/lower extremity injection      Plantar Fasciitis Treatment Plan:     Rest, ice, and elevation  Apply a cloth covered ice pack to heel four times per day 15-30 minutes.  May use frozen water bottle on ground while seated to rub along the plantar fascia.  Stretching exercises 2x per day, everyday.  Anti-inflammatory medications (If no contraindications to take)  Orthotics and supportive shoes     No response to above treatments:     Injection  Night Splints  Formal physical therapy  Walking boot  MRI     Failure of conservative treatment  Surgery     I personally discussed with patient at the visit today:     The diagnosis of this encounter  2.   Possible etiologies of the diagnosis  3.   Treatment options including advantages and disadvantages of treatment with potential risks and complications associated with these treatments  4.   Prevention strategies and ways to decrease pain     I answered all of the patients questions.     Foot/lower extremity injection    Performed by: Boyd Jean-Baptiste DPM  Authorized by: Boyd Jean-Baptiste DPM    Procedure:     Other Assisting Provider: No      Verbal consent obtained?: Yes      Risks and benefits: Risks, benefits and alternatives were discussed      Consent given by:  Patient    Patient states understanding of procedure being performed: Yes      Patient identity confirmed:  Verbally with patient    Supporting Documentation:     Indications:  Pain    Procedure Details:    Prep: patient was  "prepped and draped in usual sterile fashion                Ethyl Chloride was applied      Needle size: 25 G G    Ultrasound Guidance: no      Approach:  Medial    Laterality:  Left    Location: aponeurosis      Aponeurosis Structures: Plantar fascia origin      Injection Information:       Patient tolerance:  Patient tolerated the procedure well with no immediate complications    Dressing: sterile dressing applied          Return in about 2 months (around 7/20/2025).    Subjective     The patient was seen at the emergency department for left heel pain that started a few months ago has spread to the top of the foot.  Patient has difficulty bearing weight.  She thought was originally from the shoe that she was wearing.  Denies any injury to the area.  Was seen at the [facility] emergency department.          Constitutional:  Negative for chills and fever.   Respiratory:  Negative for chest tightness and shortness of breath.    Gastrointestinal:  Negative for nausea and vomiting.     Medications[1]    Objective     Ht 5' 3\" (1.6 m) Comment: verbal  Wt 69.9 kg (154 lb 3.2 oz)   LMP 04/27/2025 (Approximate)   BMI 27.32 kg/m²     Pain on palpation to the plantar medial tubercle.  Pain with forced dorsiflexion of the great toe, activating the windlass mechanism.  No pain on medial/lateral squeeze of the calcaneus.  Muscle strength is intact with dorsiflexion, plantarflexion, inversion and eversion of the ankle.  Mild swelling noted to the plantar medial heel.  Negative tinel sign on percussion of the tarsal tunnel.  Intact neurological sensation distally. Pedal pulses are palpable.  No open lesions or ulcerations. No signs of infection.           [1]  Current Outpatient Medications on File Prior to Visit   Medication Sig   • acetaminophen (TYLENOL) 650 mg CR tablet Take 1 tablet (650 mg total) by mouth every 8 (eight) hours as needed for mild pain   • albuterol (PROVENTIL HFA,VENTOLIN HFA) 90 mcg/act inhaler Inhale 1 puff " every 6 (six) hours as needed   • meloxicam (Mobic) 15 mg tablet Take 1 tablet (15 mg total) by mouth daily   • ibuprofen (MOTRIN) 600 mg tablet Take 1 tablet (600 mg total) by mouth every 6 (six) hours as needed for mild pain (Patient not taking: Reported on 3/6/2023)   • ibuprofen (MOTRIN) 600 mg tablet Take 1 tablet (600 mg total) by mouth every 6 (six) hours as needed for mild pain (Patient not taking: Reported on 3/6/2023)

## 2025-07-08 ENCOUNTER — OFFICE VISIT (OUTPATIENT)
Dept: PODIATRY | Facility: CLINIC | Age: 35
End: 2025-07-08
Payer: COMMERCIAL

## 2025-07-08 VITALS — BODY MASS INDEX: 26.82 KG/M2 | HEIGHT: 63 IN | WEIGHT: 151.4 LBS

## 2025-07-08 DIAGNOSIS — M79.672 LEFT FOOT PAIN: Primary | ICD-10-CM

## 2025-07-08 DIAGNOSIS — M62.462 GASTROCNEMIUS EQUINUS OF LEFT LOWER EXTREMITY: ICD-10-CM

## 2025-07-08 DIAGNOSIS — M72.2 PLANTAR FASCIITIS, LEFT: ICD-10-CM

## 2025-07-08 PROCEDURE — 99213 OFFICE O/P EST LOW 20 MIN: CPT | Performed by: PODIATRIST

## 2025-07-08 RX ORDER — IBUPROFEN 600 MG/1
600 TABLET, FILM COATED ORAL EVERY 6 HOURS PRN
Qty: 30 TABLET | Refills: 0 | Status: SHIPPED | OUTPATIENT
Start: 2025-07-08

## 2025-07-08 NOTE — PROGRESS NOTES
"Name: María Duncan      : 1990      MRN: [id number]  Encounter Provider: Boyd Jean-Baptiste DPM  Encounter Date: 2025   Encounter department: [facility] [place]  :  Assessment & Plan  Left foot pain    Orders:  •  ibuprofen (MOTRIN) 600 mg tablet; Take 1 tablet (600 mg total) by mouth every 6 (six) hours as needed for mild pain    Plantar fasciitis, left  Continue stretches.  Continues orthotics.  If pain gets worse consider injection.      Plantar Fasciitis Treatment Plan:     Rest, ice, and elevation  Apply a cloth covered ice pack to heel four times per day 15-30 minutes.  May use frozen water bottle on ground while seated to rub along the plantar fascia.  Stretching exercises 2x per day, everyday.  Anti-inflammatory medications (If no contraindications to take)  Orthotics and supportive shoes     No response to above treatments:     Injection (can consider repeat if no improvement)  Night Splints  Formal physical therapy  Walking boot  MRI     Failure of conservative treatment  Surgery     I personally discussed with patient at the visit today:     The diagnosis of this encounter  2.   Possible etiologies of the diagnosis  3.   Treatment options including advantages and disadvantages of treatment with potential risks and complications associated with these treatments  4.   Prevention strategies and ways to decrease pain     I answered all of the patients questions.         Orders:  •  ibuprofen (MOTRIN) 600 mg tablet; Take 1 tablet (600 mg total) by mouth every 6 (six) hours as needed for mild pain    Gastrocnemius equinus of left lower extremity             History of Present Illness   HPI  María Duncan is a 35 y.o. female who presents was doing very well after the injection.  Had same pain that is improved, but has come back.  Has been doing stretching exercises.   Has been wearing inserts in shoes.            Review of Systems       Objective   Ht 5' 3\" (1.6 m)   Wt 68.7 kg " (151 lb 6.4 oz)   BMI 26.82 kg/m²      Physical Exam    Pain on palpation to the plantar medial tubercle.  Pain with forced dorsiflexion of the great toe, activating the windlass mechanism.  No pain on medial/lateral squeeze of the calcaneus.  Muscle strength is intact with dorsiflexion, plantarflexion, inversion and eversion of the ankle.  Mild swelling noted to the plantar medial heel.  Negative tinel sign on percussion of the tarsal tunnel.  Intact neurological sensation distally. Pedal pulses are palpable.  No open lesions or ulcerations. No signs of infection.

## (undated) DEVICE — UNDER BUTTOCKS DRAPE W/FLUID CONTROL POUCH: Brand: CONVERTORS

## (undated) DEVICE — LIGHT HANDLE COVER SLEEVE DISP BLUE STELLAR

## (undated) DEVICE — GLOVE PI ULTRA TOUCH SZ.6.5

## (undated) DEVICE — CHLORAPREP HI-LITE 26ML ORANGE

## (undated) DEVICE — TRAY FOLEY 16FR URIMETER SILICONE SURESTEP

## (undated) DEVICE — INTENDED FOR TISSUE SEPARATION, AND OTHER PROCEDURES THAT REQUIRE A SHARP SURGICAL BLADE TO PUNCTURE OR CUT.: Brand: BARD-PARKER SAFETY BLADES SIZE 15, STERILE

## (undated) DEVICE — SUT VICRYL 0 UR-6 27 IN J603H

## (undated) DEVICE — LIGASURE LAP SLR/DIV MARYLAND 5MM

## (undated) DEVICE — ABDOMINAL PAD: Brand: DERMACEA

## (undated) DEVICE — VISUALIZATION SYSTEM: Brand: CLEARIFY

## (undated) DEVICE — Device

## (undated) DEVICE — GROUNDING PAD UNIVERSAL SLW

## (undated) DEVICE — GLOVE INDICATOR PI UNDERGLOVE SZ 6.5 BLUE

## (undated) DEVICE — PACK PBDS MINOR GYN LAP RF

## (undated) DEVICE — TRANSVAGINAL ACCESS PLATFORM: Brand: GELPOINT V-PATH